# Patient Record
Sex: MALE | Race: WHITE | Employment: FULL TIME | URBAN - METROPOLITAN AREA
[De-identification: names, ages, dates, MRNs, and addresses within clinical notes are randomized per-mention and may not be internally consistent; named-entity substitution may affect disease eponyms.]

---

## 2017-07-03 ENCOUNTER — APPOINTMENT (OUTPATIENT)
Dept: ULTRASOUND IMAGING | Facility: HOSPITAL | Age: 37
DRG: 440 | End: 2017-07-03
Attending: EMERGENCY MEDICINE
Payer: COMMERCIAL

## 2017-07-03 ENCOUNTER — HOSPITAL ENCOUNTER (INPATIENT)
Facility: HOSPITAL | Age: 37
LOS: 2 days | Discharge: HOME | DRG: 440 | End: 2017-07-05
Attending: EMERGENCY MEDICINE | Admitting: HOSPITALIST
Payer: COMMERCIAL

## 2017-07-03 DIAGNOSIS — K85.90 ACUTE PANCREATITIS, UNSPECIFIED COMPLICATION STATUS, UNSPECIFIED PANCREATITIS TYPE: Primary | ICD-10-CM

## 2017-07-03 LAB
ALBUMIN SERPL-MCNC: 4 G/DL (ref 3.5–4.8)
ALP LIVER SERPL-CCNC: 62 U/L (ref 45–117)
ALT SERPL-CCNC: 28 U/L (ref 17–63)
AST SERPL-CCNC: 26 U/L (ref 15–41)
BASOPHILS # BLD AUTO: 0.03 X10(3) UL (ref 0–0.1)
BASOPHILS NFR BLD AUTO: 0.4 %
BILIRUB SERPL-MCNC: 0.5 MG/DL (ref 0.1–2)
BILIRUB UR QL STRIP.AUTO: NEGATIVE
BUN BLD-MCNC: 22 MG/DL (ref 8–20)
CALCIUM BLD-MCNC: 9.1 MG/DL (ref 8.3–10.3)
CHLORIDE: 107 MMOL/L (ref 101–111)
CLARITY UR REFRACT.AUTO: CLEAR
CO2: 25 MMOL/L (ref 22–32)
COLOR UR AUTO: YELLOW
CREAT BLD-MCNC: 1.03 MG/DL (ref 0.7–1.3)
EOSINOPHIL # BLD AUTO: 0.16 X10(3) UL (ref 0–0.3)
EOSINOPHIL NFR BLD AUTO: 2 %
ERYTHROCYTE [DISTWIDTH] IN BLOOD BY AUTOMATED COUNT: 12.3 % (ref 11.5–16)
GLUCOSE BLD-MCNC: 83 MG/DL (ref 70–99)
GLUCOSE UR STRIP.AUTO-MCNC: NEGATIVE MG/DL
HCT VFR BLD AUTO: 41.5 % (ref 37–53)
HGB BLD-MCNC: 14.2 G/DL (ref 13–17)
IMMATURE GRANULOCYTE COUNT: 0.02 X10(3) UL (ref 0–1)
IMMATURE GRANULOCYTE RATIO %: 0.2 %
KETONES UR STRIP.AUTO-MCNC: 20 MG/DL
LEUKOCYTE ESTERASE UR QL STRIP.AUTO: NEGATIVE
LIPASE: >7500 U/L (ref 73–393)
LYMPHOCYTES # BLD AUTO: 2.22 X10(3) UL (ref 0.9–4)
LYMPHOCYTES NFR BLD AUTO: 27.5 %
M PROTEIN MFR SERPL ELPH: 7.7 G/DL (ref 6.1–8.3)
MCH RBC QN AUTO: 30 PG (ref 27–33.2)
MCHC RBC AUTO-ENTMCNC: 34.2 G/DL (ref 31–37)
MCV RBC AUTO: 87.7 FL (ref 80–99)
MONOCYTES # BLD AUTO: 1.08 X10(3) UL (ref 0.1–0.6)
MONOCYTES NFR BLD AUTO: 13.4 %
NEUTROPHIL ABS PRELIM: 4.55 X10 (3) UL (ref 1.3–6.7)
NEUTROPHILS # BLD AUTO: 4.55 X10(3) UL (ref 1.3–6.7)
NEUTROPHILS NFR BLD AUTO: 56.5 %
NITRITE UR QL STRIP.AUTO: NEGATIVE
PH UR STRIP.AUTO: 5 [PH] (ref 4.5–8)
PLATELET # BLD AUTO: 212 10(3)UL (ref 150–450)
POTASSIUM SERPL-SCNC: 3.7 MMOL/L (ref 3.6–5.1)
PROT UR STRIP.AUTO-MCNC: NEGATIVE MG/DL
RBC # BLD AUTO: 4.73 X10(6)UL (ref 4.3–5.7)
RBC UR QL AUTO: NEGATIVE
RED CELL DISTRIBUTION WIDTH-SD: 39.6 FL (ref 35.1–46.3)
SODIUM SERPL-SCNC: 141 MMOL/L (ref 136–144)
SP GR UR STRIP.AUTO: 1.02 (ref 1–1.03)
UROBILINOGEN UR STRIP.AUTO-MCNC: <2 MG/DL
WBC # BLD AUTO: 8.1 X10(3) UL (ref 4–13)

## 2017-07-03 PROCEDURE — 76700 US EXAM ABDOM COMPLETE: CPT | Performed by: EMERGENCY MEDICINE

## 2017-07-03 PROCEDURE — 99222 1ST HOSP IP/OBS MODERATE 55: CPT | Performed by: HOSPITALIST

## 2017-07-03 RX ORDER — HYDROMORPHONE HYDROCHLORIDE 1 MG/ML
1 INJECTION, SOLUTION INTRAMUSCULAR; INTRAVENOUS; SUBCUTANEOUS ONCE
Status: COMPLETED | OUTPATIENT
Start: 2017-07-03 | End: 2017-07-03

## 2017-07-03 RX ORDER — HYDROMORPHONE HYDROCHLORIDE 1 MG/ML
0.2 INJECTION, SOLUTION INTRAMUSCULAR; INTRAVENOUS; SUBCUTANEOUS EVERY 2 HOUR PRN
Status: DISCONTINUED | OUTPATIENT
Start: 2017-07-03 | End: 2017-07-05

## 2017-07-03 RX ORDER — HEPARIN SODIUM 5000 [USP'U]/ML
5000 INJECTION, SOLUTION INTRAVENOUS; SUBCUTANEOUS EVERY 8 HOURS SCHEDULED
Status: DISCONTINUED | OUTPATIENT
Start: 2017-07-03 | End: 2017-07-05

## 2017-07-03 RX ORDER — HYDROMORPHONE HYDROCHLORIDE 1 MG/ML
0.4 INJECTION, SOLUTION INTRAMUSCULAR; INTRAVENOUS; SUBCUTANEOUS EVERY 2 HOUR PRN
Status: DISCONTINUED | OUTPATIENT
Start: 2017-07-03 | End: 2017-07-05

## 2017-07-03 RX ORDER — ONDANSETRON 2 MG/ML
4 INJECTION INTRAMUSCULAR; INTRAVENOUS ONCE
Status: COMPLETED | OUTPATIENT
Start: 2017-07-03 | End: 2017-07-03

## 2017-07-03 RX ORDER — SODIUM CHLORIDE, SODIUM LACTATE, POTASSIUM CHLORIDE, CALCIUM CHLORIDE 600; 310; 30; 20 MG/100ML; MG/100ML; MG/100ML; MG/100ML
INJECTION, SOLUTION INTRAVENOUS ONCE
Status: DISCONTINUED | OUTPATIENT
Start: 2017-07-03 | End: 2017-07-05

## 2017-07-03 RX ORDER — HYDROMORPHONE HYDROCHLORIDE 1 MG/ML
0.5 INJECTION, SOLUTION INTRAMUSCULAR; INTRAVENOUS; SUBCUTANEOUS EVERY 30 MIN PRN
Status: DISCONTINUED | OUTPATIENT
Start: 2017-07-03 | End: 2017-07-03

## 2017-07-03 RX ORDER — SODIUM CHLORIDE 9 MG/ML
1000 INJECTION, SOLUTION INTRAVENOUS ONCE
Status: COMPLETED | OUTPATIENT
Start: 2017-07-03 | End: 2017-07-03

## 2017-07-03 RX ORDER — SODIUM CHLORIDE, SODIUM LACTATE, POTASSIUM CHLORIDE, CALCIUM CHLORIDE 600; 310; 30; 20 MG/100ML; MG/100ML; MG/100ML; MG/100ML
INJECTION, SOLUTION INTRAVENOUS CONTINUOUS
Status: DISCONTINUED | OUTPATIENT
Start: 2017-07-03 | End: 2017-07-05

## 2017-07-03 RX ORDER — HYDROMORPHONE HYDROCHLORIDE 1 MG/ML
0.8 INJECTION, SOLUTION INTRAMUSCULAR; INTRAVENOUS; SUBCUTANEOUS EVERY 2 HOUR PRN
Status: DISCONTINUED | OUTPATIENT
Start: 2017-07-03 | End: 2017-07-05

## 2017-07-03 RX ORDER — DEXTROSE AND SODIUM CHLORIDE 5; .45 G/100ML; G/100ML
INJECTION, SOLUTION INTRAVENOUS CONTINUOUS
Status: DISCONTINUED | OUTPATIENT
Start: 2017-07-03 | End: 2017-07-03

## 2017-07-04 LAB
ALBUMIN SERPL-MCNC: 3.1 G/DL (ref 3.5–4.8)
ALP LIVER SERPL-CCNC: 50 U/L (ref 45–117)
ALT SERPL-CCNC: 22 U/L (ref 17–63)
AST SERPL-CCNC: 18 U/L (ref 15–41)
BASOPHILS # BLD AUTO: 0.04 X10(3) UL (ref 0–0.1)
BASOPHILS NFR BLD AUTO: 0.6 %
BILIRUB SERPL-MCNC: 0.8 MG/DL (ref 0.1–2)
BUN BLD-MCNC: 17 MG/DL (ref 8–20)
CALCIUM BLD-MCNC: 7.8 MG/DL (ref 8.3–10.3)
CHLORIDE: 109 MMOL/L (ref 101–111)
CO2: 25 MMOL/L (ref 22–32)
CREAT BLD-MCNC: 0.82 MG/DL (ref 0.7–1.3)
EOSINOPHIL # BLD AUTO: 0.15 X10(3) UL (ref 0–0.3)
EOSINOPHIL NFR BLD AUTO: 2.3 %
ERYTHROCYTE [DISTWIDTH] IN BLOOD BY AUTOMATED COUNT: 12.3 % (ref 11.5–16)
GLUCOSE BLD-MCNC: 84 MG/DL (ref 70–99)
HCT VFR BLD AUTO: 39.1 % (ref 37–53)
HGB BLD-MCNC: 13.1 G/DL (ref 13–17)
IMMATURE GRANULOCYTE COUNT: 0.02 X10(3) UL (ref 0–1)
IMMATURE GRANULOCYTE RATIO %: 0.3 %
LYMPHOCYTES # BLD AUTO: 1.49 X10(3) UL (ref 0.9–4)
LYMPHOCYTES NFR BLD AUTO: 23.2 %
M PROTEIN MFR SERPL ELPH: 6 G/DL (ref 6.1–8.3)
MCH RBC QN AUTO: 29.8 PG (ref 27–33.2)
MCHC RBC AUTO-ENTMCNC: 33.5 G/DL (ref 31–37)
MCV RBC AUTO: 88.9 FL (ref 80–99)
MONOCYTES # BLD AUTO: 0.65 X10(3) UL (ref 0.1–0.6)
MONOCYTES NFR BLD AUTO: 10.1 %
NEUTROPHIL ABS PRELIM: 4.08 X10 (3) UL (ref 1.3–6.7)
NEUTROPHILS # BLD AUTO: 4.08 X10(3) UL (ref 1.3–6.7)
NEUTROPHILS NFR BLD AUTO: 63.5 %
PLATELET # BLD AUTO: 186 10(3)UL (ref 150–450)
POTASSIUM SERPL-SCNC: 3.5 MMOL/L (ref 3.6–5.1)
POTASSIUM SERPL-SCNC: 3.5 MMOL/L (ref 3.6–5.1)
RBC # BLD AUTO: 4.4 X10(6)UL (ref 4.3–5.7)
RED CELL DISTRIBUTION WIDTH-SD: 40.3 FL (ref 35.1–46.3)
SODIUM SERPL-SCNC: 141 MMOL/L (ref 136–144)
WBC # BLD AUTO: 6.4 X10(3) UL (ref 4–13)

## 2017-07-04 PROCEDURE — 99232 SBSQ HOSP IP/OBS MODERATE 35: CPT | Performed by: INTERNAL MEDICINE

## 2017-07-04 RX ORDER — ONDANSETRON 2 MG/ML
8 INJECTION INTRAMUSCULAR; INTRAVENOUS EVERY 6 HOURS PRN
Status: DISCONTINUED | OUTPATIENT
Start: 2017-07-04 | End: 2017-07-05

## 2017-07-04 RX ORDER — POTASSIUM CHLORIDE 14.9 MG/ML
20 INJECTION INTRAVENOUS ONCE
Status: COMPLETED | OUTPATIENT
Start: 2017-07-04 | End: 2017-07-04

## 2017-07-04 RX ORDER — HYDROCODONE BITARTRATE AND ACETAMINOPHEN 5; 325 MG/1; MG/1
1-2 TABLET ORAL EVERY 6 HOURS PRN
Qty: 20 TABLET | Refills: 0 | Status: SHIPPED | OUTPATIENT
Start: 2017-07-04 | End: 2020-12-07 | Stop reason: ALTCHOICE

## 2017-07-04 NOTE — ED NOTES
[facility]. Pt verbalized understanding of plan of care.

## 2017-07-04 NOTE — PLAN OF CARE
Patient refusing to wear SCD's, writing RN explained the benefits of wearing SCD's to the patient and the risks associated with not wearing them, but patient continues to refuse to wear them. Patient medicated for abdominal pain, will monitor for response.

## 2017-07-04 NOTE — CONSULTS
[facility]    Gastroenterology Initial Consultation    Earle Marr Patient Status:  Inpatient    1980 MRN [id number]   [facility] [place] [facility] 3NW-A Attending Jovani De Luna MD   Hosp Day # 1 PCP None Pcp       Reason for Consultation negative  General: Denies fatigue, chills/fever, night sweats, weight loss, loss of appetite, weight gain, sleep disturbance. Neurological: Denies frequent headaches, recent passing out, recent dizziness, convulsions/seizures.   Cardiovascular: Denies hist Date   WBC 6.4 07/04/2017   HGB 13.1 07/04/2017   HCT 39.1 07/04/2017   .0 07/04/2017   CREATSERUM 0.82 07/04/2017   BUN 17 07/04/2017    07/04/2017   K 3.5 07/04/2017   K 3.5 07/04/2017    07/04/2017   CO2 25.0 07/04/2017   GLU 84 07/04

## 2017-07-04 NOTE — H&P
[facility]  History and Physical     Ruchi Chapman Patient Status:  Inpatient    1980 MRN [id number]   [facility] 3NW-A Attending Iris Pichardo MD   Hosp Day # 0 PCP None Pcp     Chief Complaint: abd pain since 4 pm today point review of systems was completed. Pertinent positives and negatives noted in the HPI. Physical Exam:    /66 (BP Location: Left arm)   Pulse 68   Temp (!) 97.4 °F (36.3 °C) (Oral)   Resp 18   SpO2 100%   General: No acute distress.  Alert an clear liquid diet as tolerated. He will be referred to GI for further evaluation including endoscopic ultrasound. 2. Dehydration continue IV fluids  3.  History of asthma intermittent stable at present time      Quality:  · DVT Prophylaxis: scd and hepari

## 2017-07-04 NOTE — PROGRESS NOTES
[facility]  Progress Note     Danny Clines Patient Status:  Inpatient    1980 MRN [id number]   [facility] 3NW-A Attending Kala Almonte MD   [facility] Day # 1 PCP None Pcp     Chief Complaint: abd pain    S: Patient without IVF  2. GI to eval  3. NPO, start diet per surgery  2. Dehdyration  1. IVF  3.  Asthma    Plan of care: continue current managemetn    Quality:  · DVT Prophylaxis: SCD, HSQ  · CODE status: Full  · Mccollum: none  · Central line: none    Estimated date of disch

## 2017-07-04 NOTE — ED PROVIDER NOTES
Patient Seen in: [facility] Emergency Department    History   Patient presents with:  Abdomen/Flank Pain (GI/)    Stated Complaint: abd pain    HPI    28-year-old white male who presents emerged from today for coming of abdominal pain.   Patient sh Physical Exam    Well-developed well-nourished male who is sitting on the gurney, he is awake and alert and appears in no acute discomfort or distress.     Vital signs are stable he is afebrile    Head is normocephalic atraumatic conjunctiva is kiera  Normal appearing gallbladder, intrahepatic ducts, and common bile duct.  Common bile duct diameter is 4 mm. Negative sonographic Soler's sign. PANCREAS:  Obscured by overlying bowel gas limiting evaluation.   SPLEEN:  Unremarkable measuring up to 10.1 cm

## 2017-07-05 VITALS
RESPIRATION RATE: 16 BRPM | TEMPERATURE: 99 F | OXYGEN SATURATION: 98 % | SYSTOLIC BLOOD PRESSURE: 108 MMHG | HEART RATE: 72 BPM | DIASTOLIC BLOOD PRESSURE: 47 MMHG

## 2017-07-05 LAB
POTASSIUM SERPL-SCNC: 4.1 MMOL/L (ref 3.6–5.1)
TRIGLYCERIDES: 101 MG/DL (ref ?–150)

## 2017-07-05 PROCEDURE — 99239 HOSP IP/OBS DSCHRG MGMT >30: CPT | Performed by: INTERNAL MEDICINE

## 2017-07-05 NOTE — PROGRESS NOTES
D/c meds and instructions explained to pt. Pt. Verbalized understanding.  IV d/c'd with catheter intact.

## 2017-07-05 NOTE — DISCHARGE SUMMARY
[facility] HOSPITALIST  DISCHARGE SUMMARY     Earle Marr Patient Status:  Inpatient    1980 MRN [id number]   [facility] 3NW-A Attending Jovani De Luna MD   Hosp Day # 2 PCP None Pcp     Date of Admission: 7/3/2017  Date of Discha this in the past at [facility] and had workup with genetic testing and ESCP w/ sphincterotomy a few years ago. He responded well to conservative management and was toleratign diet prior to DC.      Procedures during hospitalization:   • none    Incidental or signi

## 2017-07-05 NOTE — PROGRESS NOTES
[facility]    Gastroenterology Follow-Up Note      Mathew Levi Patient Status:  Inpatient    1980 MRN [id number]   [facility] 3NW-A Attending Kristeen Hashimoto, MD   Hosp Day # 2 PCP None Pcp     Chief Complaint/Reason for

## 2017-07-05 NOTE — PAYOR COMM NOTE
--------------  ADMISSION REVIEW     Payor: Iraida Sarmiento  #:  [id number]  Authorization Number: Cornelia Rudolph date: 7/3/2017  5:34 PM       Admitting Physician: Lizzeth Saul MD  Attending Physician:  Teri Floyd MD  Primary Car ---------                               -----------         ------                     CBC W/ DIFFERENTIAL[[id number]]          Abnormal            Final result                 Please view results for these tests on the individual orders.    URINALYSIS WIT HCl PF (DILAUDID) 1 MG/ML injection 0.8 mg     Date Action Dose Route User    7/4/2017 2157 Given 0.8 mg Intravenous John Ross RN      lactated ringers infusion     Date Action Dose Route User    7/4/2017 2203 New Bag (none) Intravenous Dressler, M

## 2017-07-06 LAB
IMMUNOGLOBULIN G SUBCLASS 1: 380 MG/DL
IMMUNOGLOBULIN G SUBCLASS 2: 241 MG/DL
IMMUNOGLOBULIN G SUBCLASS 3: 39 MG/DL
IMMUNOGLOBULIN G SUBCLASS 4: 33 MG/DL

## 2017-07-07 NOTE — PAYOR COMM NOTE
--------------  CONTINUED STAY REVIEW    Payor: Iraida Sarmiento  #:  [id number]  Authorization Number: Jhonnie Rich date: 7/3/2017  5:34 PM       Admitting Physician: Nathalia Triana MD  Primary Care Physician: Pcp, None    REVIEW LUIS

## 2018-01-03 ENCOUNTER — APPOINTMENT (OUTPATIENT)
Dept: LAB | Facility: HOSPITAL | Age: 38
End: 2018-01-03
Attending: INTERNAL MEDICINE
Payer: COMMERCIAL

## 2018-01-03 DIAGNOSIS — R10.13 EPIGASTRIC PAIN: ICD-10-CM

## 2018-01-03 LAB — LIPASE: 1478 U/L (ref 73–393)

## 2018-01-03 PROCEDURE — 36415 COLL VENOUS BLD VENIPUNCTURE: CPT

## 2018-01-03 PROCEDURE — 83690 ASSAY OF LIPASE: CPT

## 2019-02-15 ENCOUNTER — OFFICE VISIT (OUTPATIENT)
Dept: FAMILY MEDICINE CLINIC | Facility: CLINIC | Age: 39
End: 2019-02-15
Payer: COMMERCIAL

## 2019-02-15 VITALS
BODY MASS INDEX: 22 KG/M2 | OXYGEN SATURATION: 98 % | SYSTOLIC BLOOD PRESSURE: 122 MMHG | DIASTOLIC BLOOD PRESSURE: 76 MMHG | HEART RATE: 72 BPM | TEMPERATURE: 97 F | WEIGHT: 150 LBS

## 2019-02-15 DIAGNOSIS — H66.91 ACUTE OTITIS MEDIA, RIGHT: Primary | ICD-10-CM

## 2019-02-15 PROCEDURE — 99213 OFFICE O/P EST LOW 20 MIN: CPT | Performed by: NURSE PRACTITIONER

## 2019-02-15 RX ORDER — AMOXICILLIN 500 MG/1
500 CAPSULE ORAL 2 TIMES DAILY
Qty: 20 CAPSULE | Refills: 0 | Status: SHIPPED | OUTPATIENT
Start: 2019-02-15 | End: 2019-02-25

## 2019-02-15 NOTE — PROGRESS NOTES
CHIEF COMPLAINT:   Patient presents with:  Ear Pain: L ear x3-4 days      HPI:   Harman Thakkar is a 41year old male who presents to clinic today with complaints of L ear pain. Has had for 3-4 days. Pain is described as radiating into his jaw.   Nhi NOSE: nostrils patent, no exudates, nasal mucosa pink   THROAT: oral mucosa pink, moist. Posterior pharynx is not erythematous or injected. No exudates. NECK: supple, non-tender  LUNGS: clear to auscultation bilaterally, no wheezes or rhonchi.  Breathing i · Finish all of the antibiotic medicine given, even though you may feel better after the first few days. · You may use over-the-counter medicine, such as acetaminophen or ibuprofen, to control pain and fever, unless something else was prescribed.  If you h

## 2019-02-15 NOTE — PATIENT INSTRUCTIONS
-sudafed for fluid behind ear drum  -Take antibiotics with food and plenty of water. Eat yogurt or take probiotic daily.   Elise Cordoba is a good otc probiotic.  -follow up if no improvement in 3-4 days or worsening in symptoms  -ibuprofen as needed      [facility], [address]. All rights reserved. This information is not intended as a substitute for professional medical care. Always follow your healthcare professional's instructions.

## 2020-10-25 ENCOUNTER — IMMUNIZATION (OUTPATIENT)
Dept: FAMILY MEDICINE CLINIC | Facility: CLINIC | Age: 40
End: 2020-10-25
Payer: COMMERCIAL

## 2020-10-25 PROCEDURE — 90686 IIV4 VACC NO PRSV 0.5 ML IM: CPT | Performed by: PHYSICIAN ASSISTANT

## 2020-10-25 PROCEDURE — 90471 IMMUNIZATION ADMIN: CPT | Performed by: PHYSICIAN ASSISTANT

## 2024-06-27 ENCOUNTER — HOSPITAL ENCOUNTER (INPATIENT)
Facility: HOSPITAL | Age: 44
LOS: 2 days | Discharge: HOME | End: 2024-06-29
Attending: EMERGENCY MEDICINE | Admitting: HOSPITALIST
Payer: COMMERCIAL

## 2024-06-27 DIAGNOSIS — K85.00 IDIOPATHIC ACUTE PANCREATITIS WITHOUT INFECTION OR NECROSIS (HCC): Primary | ICD-10-CM

## 2024-06-27 LAB
ALBUMIN SERPL-MCNC: 4 G/DL (ref 3.4–5)
ALBUMIN/GLOB SERPL: 1 {RATIO} (ref 1–2)
ALP LIVER SERPL-CCNC: 79 U/L
ALT SERPL-CCNC: 24 U/L
ANION GAP SERPL CALC-SCNC: 7 MMOL/L (ref 0–18)
AST SERPL-CCNC: 17 U/L (ref 15–37)
BASOPHILS # BLD AUTO: 0.03 X10(3) UL (ref 0–0.2)
BASOPHILS NFR BLD AUTO: 0.5 %
BILIRUB SERPL-MCNC: 1 MG/DL (ref 0.1–2)
BILIRUB UR QL STRIP.AUTO: NEGATIVE
BUN BLD-MCNC: 13 MG/DL (ref 9–23)
CALCIUM BLD-MCNC: 9.4 MG/DL (ref 8.5–10.1)
CHLORIDE SERPL-SCNC: 104 MMOL/L (ref 98–112)
CLARITY UR REFRACT.AUTO: CLEAR
CO2 SERPL-SCNC: 25 MMOL/L (ref 21–32)
COLOR UR AUTO: YELLOW
CREAT BLD-MCNC: 0.92 MG/DL
EGFRCR SERPLBLD CKD-EPI 2021: 107 ML/MIN/1.73M2 (ref 60–?)
EOSINOPHIL # BLD AUTO: 0.18 X10(3) UL (ref 0–0.7)
EOSINOPHIL NFR BLD AUTO: 2.8 %
ERYTHROCYTE [DISTWIDTH] IN BLOOD BY AUTOMATED COUNT: 11.9 %
GLOBULIN PLAS-MCNC: 3.9 G/DL (ref 2.8–4.4)
GLUCOSE BLD-MCNC: 90 MG/DL (ref 70–99)
GLUCOSE UR STRIP.AUTO-MCNC: NORMAL MG/DL
HCT VFR BLD AUTO: 44.1 %
HGB BLD-MCNC: 15.5 G/DL
IMM GRANULOCYTES # BLD AUTO: 0.02 X10(3) UL (ref 0–1)
IMM GRANULOCYTES NFR BLD: 0.3 %
KETONES UR STRIP.AUTO-MCNC: 40 MG/DL
LEUKOCYTE ESTERASE UR QL STRIP.AUTO: NEGATIVE
LIPASE SERPL-CCNC: 4759 U/L (ref 13–75)
LYMPHOCYTES # BLD AUTO: 1.29 X10(3) UL (ref 1–4)
LYMPHOCYTES NFR BLD AUTO: 20.1 %
MCH RBC QN AUTO: 30.8 PG (ref 26–34)
MCHC RBC AUTO-ENTMCNC: 35.1 G/DL (ref 31–37)
MCV RBC AUTO: 87.7 FL
MONOCYTES # BLD AUTO: 0.57 X10(3) UL (ref 0.1–1)
MONOCYTES NFR BLD AUTO: 8.9 %
NEUTROPHILS # BLD AUTO: 4.34 X10 (3) UL (ref 1.5–7.7)
NEUTROPHILS # BLD AUTO: 4.34 X10(3) UL (ref 1.5–7.7)
NEUTROPHILS NFR BLD AUTO: 67.4 %
NITRITE UR QL STRIP.AUTO: NEGATIVE
OSMOLALITY SERPL CALC.SUM OF ELEC: 282 MOSM/KG (ref 275–295)
PH UR STRIP.AUTO: 7.5 [PH] (ref 5–8)
PLATELET # BLD AUTO: 249 10(3)UL (ref 150–450)
POTASSIUM SERPL-SCNC: 4.2 MMOL/L (ref 3.5–5.1)
PROT SERPL-MCNC: 7.9 G/DL (ref 6.4–8.2)
PROT UR STRIP.AUTO-MCNC: 20 MG/DL
RBC # BLD AUTO: 5.03 X10(6)UL
RBC UR QL AUTO: NEGATIVE
SODIUM SERPL-SCNC: 136 MMOL/L (ref 136–145)
SP GR UR STRIP.AUTO: 1.03 (ref 1–1.03)
UROBILINOGEN UR STRIP.AUTO-MCNC: 2 MG/DL
WBC # BLD AUTO: 6.4 X10(3) UL (ref 4–11)

## 2024-06-27 PROCEDURE — 80053 COMPREHEN METABOLIC PANEL: CPT

## 2024-06-27 PROCEDURE — 85025 COMPLETE CBC W/AUTO DIFF WBC: CPT

## 2024-06-27 PROCEDURE — 83690 ASSAY OF LIPASE: CPT

## 2024-06-27 PROCEDURE — 80053 COMPREHEN METABOLIC PANEL: CPT | Performed by: EMERGENCY MEDICINE

## 2024-06-27 PROCEDURE — 96374 THER/PROPH/DIAG INJ IV PUSH: CPT

## 2024-06-27 PROCEDURE — 83690 ASSAY OF LIPASE: CPT | Performed by: EMERGENCY MEDICINE

## 2024-06-27 PROCEDURE — 96375 TX/PRO/DX INJ NEW DRUG ADDON: CPT

## 2024-06-27 PROCEDURE — 81001 URINALYSIS AUTO W/SCOPE: CPT | Performed by: EMERGENCY MEDICINE

## 2024-06-27 PROCEDURE — 85025 COMPLETE CBC W/AUTO DIFF WBC: CPT | Performed by: EMERGENCY MEDICINE

## 2024-06-27 PROCEDURE — 99285 EMERGENCY DEPT VISIT HI MDM: CPT

## 2024-06-27 PROCEDURE — 81001 URINALYSIS AUTO W/SCOPE: CPT

## 2024-06-27 PROCEDURE — 96361 HYDRATE IV INFUSION ADD-ON: CPT

## 2024-06-27 RX ORDER — ALBUTEROL SULFATE 90 UG/1
2 AEROSOL, METERED RESPIRATORY (INHALATION) EVERY 6 HOURS PRN
Status: DISCONTINUED | OUTPATIENT
Start: 2024-06-27 | End: 2024-06-29

## 2024-06-27 RX ORDER — HYDROMORPHONE HYDROCHLORIDE 1 MG/ML
0.5 INJECTION, SOLUTION INTRAMUSCULAR; INTRAVENOUS; SUBCUTANEOUS EVERY 30 MIN PRN
Status: DISCONTINUED | OUTPATIENT
Start: 2024-06-27 | End: 2024-06-27 | Stop reason: HOSPADM

## 2024-06-27 RX ORDER — BISACODYL 10 MG
10 SUPPOSITORY, RECTAL RECTAL
Status: DISCONTINUED | OUTPATIENT
Start: 2024-06-27 | End: 2024-06-29

## 2024-06-27 RX ORDER — SENNOSIDES 8.6 MG
17.2 TABLET ORAL NIGHTLY PRN
Status: DISCONTINUED | OUTPATIENT
Start: 2024-06-27 | End: 2024-06-29

## 2024-06-27 RX ORDER — POLYETHYLENE GLYCOL 3350 17 G/17G
17 POWDER, FOR SOLUTION ORAL DAILY PRN
Status: DISCONTINUED | OUTPATIENT
Start: 2024-06-27 | End: 2024-06-29

## 2024-06-27 RX ORDER — ONDANSETRON 2 MG/ML
4 INJECTION INTRAMUSCULAR; INTRAVENOUS EVERY 6 HOURS PRN
Status: DISCONTINUED | OUTPATIENT
Start: 2024-06-27 | End: 2024-06-29

## 2024-06-27 RX ORDER — HYDROMORPHONE HYDROCHLORIDE 1 MG/ML
0.2 INJECTION, SOLUTION INTRAMUSCULAR; INTRAVENOUS; SUBCUTANEOUS EVERY 2 HOUR PRN
Status: DISCONTINUED | OUTPATIENT
Start: 2024-06-27 | End: 2024-06-29

## 2024-06-27 RX ORDER — SODIUM CHLORIDE, SODIUM LACTATE, POTASSIUM CHLORIDE, CALCIUM CHLORIDE 600; 310; 30; 20 MG/100ML; MG/100ML; MG/100ML; MG/100ML
INJECTION, SOLUTION INTRAVENOUS CONTINUOUS
Status: DISCONTINUED | OUTPATIENT
Start: 2024-06-27 | End: 2024-06-29

## 2024-06-27 RX ORDER — HYDROMORPHONE HYDROCHLORIDE 1 MG/ML
0.5 INJECTION, SOLUTION INTRAMUSCULAR; INTRAVENOUS; SUBCUTANEOUS EVERY 30 MIN PRN
Status: DISCONTINUED | OUTPATIENT
Start: 2024-06-27 | End: 2024-06-27

## 2024-06-27 RX ORDER — MELATONIN
3 NIGHTLY PRN
Status: DISCONTINUED | OUTPATIENT
Start: 2024-06-27 | End: 2024-06-29

## 2024-06-27 RX ORDER — ENOXAPARIN SODIUM 100 MG/ML
40 INJECTION SUBCUTANEOUS DAILY
Status: DISCONTINUED | OUTPATIENT
Start: 2024-06-27 | End: 2024-06-29

## 2024-06-27 RX ORDER — MORPHINE SULFATE 4 MG/ML
4 INJECTION, SOLUTION INTRAMUSCULAR; INTRAVENOUS EVERY 2 HOUR PRN
Status: DISCONTINUED | OUTPATIENT
Start: 2024-06-27 | End: 2024-06-27

## 2024-06-27 RX ORDER — ENEMA 19; 7 G/133ML; G/133ML
1 ENEMA RECTAL ONCE AS NEEDED
Status: DISCONTINUED | OUTPATIENT
Start: 2024-06-27 | End: 2024-06-29

## 2024-06-27 RX ORDER — PROCHLORPERAZINE EDISYLATE 5 MG/ML
5 INJECTION INTRAMUSCULAR; INTRAVENOUS EVERY 8 HOURS PRN
Status: DISCONTINUED | OUTPATIENT
Start: 2024-06-27 | End: 2024-06-29

## 2024-06-27 RX ORDER — ONDANSETRON 2 MG/ML
4 INJECTION INTRAMUSCULAR; INTRAVENOUS EVERY 4 HOURS PRN
Status: DISCONTINUED | OUTPATIENT
Start: 2024-06-27 | End: 2024-06-27

## 2024-06-27 RX ORDER — SODIUM CHLORIDE 9 MG/ML
INJECTION, SOLUTION INTRAVENOUS CONTINUOUS
Status: ACTIVE | OUTPATIENT
Start: 2024-06-27 | End: 2024-06-27

## 2024-06-27 RX ORDER — ACETAMINOPHEN 325 MG/1
650 TABLET ORAL EVERY 4 HOURS PRN
Status: DISCONTINUED | OUTPATIENT
Start: 2024-06-27 | End: 2024-06-29

## 2024-06-27 RX ORDER — KETOROLAC TROMETHAMINE 15 MG/ML
15 INJECTION, SOLUTION INTRAMUSCULAR; INTRAVENOUS ONCE
Status: COMPLETED | OUTPATIENT
Start: 2024-06-27 | End: 2024-06-27

## 2024-06-27 RX ORDER — HYDROMORPHONE HYDROCHLORIDE 1 MG/ML
0.4 INJECTION, SOLUTION INTRAMUSCULAR; INTRAVENOUS; SUBCUTANEOUS EVERY 2 HOUR PRN
Status: DISCONTINUED | OUTPATIENT
Start: 2024-06-27 | End: 2024-06-29

## 2024-06-27 RX ORDER — MORPHINE SULFATE 4 MG/ML
2 INJECTION, SOLUTION INTRAMUSCULAR; INTRAVENOUS EVERY 2 HOUR PRN
Status: DISCONTINUED | OUTPATIENT
Start: 2024-06-27 | End: 2024-06-27

## 2024-06-27 RX ORDER — HYDROCODONE BITARTRATE AND ACETAMINOPHEN 5; 325 MG/1; MG/1
2 TABLET ORAL EVERY 4 HOURS PRN
Status: DISCONTINUED | OUTPATIENT
Start: 2024-06-27 | End: 2024-06-29

## 2024-06-27 RX ORDER — MORPHINE SULFATE 4 MG/ML
1 INJECTION, SOLUTION INTRAMUSCULAR; INTRAVENOUS EVERY 2 HOUR PRN
Status: DISCONTINUED | OUTPATIENT
Start: 2024-06-27 | End: 2024-06-27

## 2024-06-27 RX ORDER — HYDROCODONE BITARTRATE AND ACETAMINOPHEN 5; 325 MG/1; MG/1
1 TABLET ORAL EVERY 4 HOURS PRN
Status: DISCONTINUED | OUTPATIENT
Start: 2024-06-27 | End: 2024-06-29

## 2024-06-27 RX ORDER — HYDROMORPHONE HYDROCHLORIDE 1 MG/ML
0.8 INJECTION, SOLUTION INTRAMUSCULAR; INTRAVENOUS; SUBCUTANEOUS EVERY 2 HOUR PRN
Status: DISCONTINUED | OUTPATIENT
Start: 2024-06-27 | End: 2024-06-29

## 2024-06-27 RX ORDER — KETOROLAC TROMETHAMINE 15 MG/ML
15 INJECTION, SOLUTION INTRAMUSCULAR; INTRAVENOUS EVERY 6 HOURS PRN
Status: DISPENSED | OUTPATIENT
Start: 2024-06-27 | End: 2024-06-29

## 2024-06-27 NOTE — ED QUICK NOTES
Orders for admission, patient is aware of plan and ready to go upstairs. Any questions, please call ED RN Pio at extension [phone].     Patient Covid vaccination status: Fully vaccinated     COVID Test Ordered in ED: None    COVID Suspicion at Admission: N/A    Running Infusions:  Normal saline 999/hr    Mental Status/LOC at time of transport: A&Ox4    Other pertinent information:   CIWA score: N/A   NIH score:  N/A

## 2024-06-27 NOTE — PROGRESS NOTES
NURSING ADMISSION NOTE      Patient admitted via Cart  Oriented to room.  Safety precautions initiated.  Bed in low position.  Call light in reach.    Admitted with pancreatitis, pain currently controlled, denies nausea. Upper abdominal tenderness with palpation. IVF. NPO. Up self. POC reviewed, call light within reach.

## 2024-06-27 NOTE — ED INITIAL ASSESSMENT (HPI)
Patient arrives to the ED with c/o abd pain that started Friday noc.    Friday noc patient states his pancreatitis started to flare up. Normally he would fast, have simple diet, and normally recovers on his own. This time, his pain has shot back up. Has not been eating for the last 24 hours. Here to r/o pancreatitis.

## 2024-06-27 NOTE — PHYSICAL THERAPY NOTE
PT order received. Chart reviewed. Per discussion with RN, pt has no skilled IP PT needs at this time as he is up ad quique without concerns. Will sign off on current order. If change in functional mobility status occurs, please re-order therapy services.

## 2024-06-27 NOTE — H&P
[facility] History and Physical      Chief Complaint   Patient presents with    Abdomen/Flank Pain        PCP: Morgan Gallegos MD      History of Present Illness: Patient is a 43 year old male with PMH sig for asthma and history of chronic recurrent idiopathic pancreatitis last episode in 2017 presented with abdominal pain.  Symptoms started about Friday and he recognized it as similar to his prior episodes.  He decreased his oral intake and started fasting in hopes that it would spontaneously resolved but the symptoms continue to worsen and progress.  He has not eaten in the last 24 hours and given ongoing symptoms he came to the ER for further evaluation and treatment.  In the ER his vitals were stable.  Labs significant for a lipase of 4700.  Started on aggressive isotonic crystalloids, GI consulted and admitted for further evaluation and treatment.    Past Medical History:    Asthma due to environmental allergies (HCC)    dust and cats    Chronic recurrent pancreatitis (HCC)    last episode 2017. idiopathic, CFTR/PRSS1/SPINK1 - neg genetics 12/9/13. Rx Zenpep, lipase, PPI and Carafate in past. Neg IgG4 and anti tTGAb, MR  neg    Erectile dysfunction    difficulty maintaining erection - 10 mg Cialis prn. Dr. Cason    GERD (gastroesophageal reflux disease)    Mild intermittent asthma (HCC)    albuterol MDI prn - years since used      Past Surgical History:   Procedure Laterality Date    Egd  06/20/2012    [facility] , Endoscopic US- neg    Ercp,diagnostic  08/03/2012    Dr. Lofton at [facility] [place] - nml    Excision of lesion, eyelid - os - left eye Left 07/29/2021    Dr. Correa    Joint aspiration/injection Left 07/2021    Dr. Mcguire    Other surgical history  2012    sphincterectomy for pancreatitis    [place] teeth removed  2000 4        ALL:  Allergies   Allergen Reactions    Radiology Contrast Iodinated Dyes RASH        Prior to Admission Medications   Medication Sig    ALBUTEROL 108 (90 Base) MCG/ACT Inhalation Aero  Soln TAKE 2 PUFFS BY MOUTH EVERY 6 HOURS AS NEEDED FOR WHEEZE OR SHORTNESS OF BREATH    Mometasone Furoate 0.1 % External Cream Apply to affected areas twice daily as needed    hydrocortisone 2.5 % External Cream Apply to affected areas as needed  - consider 7 days at a time    Tadalafil 20 MG Oral Tab Take 1 tablet (20 mg total) by mouth daily as needed.       Social History     Tobacco Use    Smoking status: Never    Smokeless tobacco: Never   Substance Use Topics    Alcohol use: Yes     Comment: couple times a yr - 2 beers        Fam Hx  Family History   Problem Relation Age of Onset    Hypertension Father     Thyroid Disorder Mother     No Known Problems Daughter     No Known Problems Son     Dementia Maternal Grandmother     Melanoma Maternal Grandmother     Gastro-Intestinal Disorder Maternal Grandfather         celiac disease    Prostate Cancer Maternal Grandfather     No Known Problems Paternal Grandmother     Heart Disorder Paternal Grandfather     Colon Cancer Paternal Grandfather 50    No Known Problems Brother        Review of Systems  Comprehensive ROS reviewed and negative except for what is stated in HPI.      OBJECTIVE:  /72   Pulse 63   Temp 97.8 °F (36.6 °C) (Temporal)   Resp 16   Ht 5' 9\" (1.753 m)   Wt 175 lb (79.4 kg)   SpO2 100%   BMI 25.84 kg/m²   General:  Alert, no distress, appears stated age.    Head:  Normocephalic, without obvious abnormality, atraumatic.   Eyes:  Sclera anicteric, No conjunctival pallor, EOMs intact.    Nose: Nares normal. Septum midline. Mucosa normal. No drainage.   Throat: Lips, mucosa, and tongue normal. Teeth and gums normal.   Neck: Supple, symmetrical, trachea midline, no cervical or supraclavicular lymph adenopathy, thyroid: no enlargment/tenderness/nodules appreciated   Lungs:   Clear to auscultation bilaterally. Normal effort   Chest wall:  No tenderness or deformity.   Heart:  Regular rate and rhythm, S1, S2 normal, no murmur, rub or gallop  appreciated   Abdomen:   Soft, non-tender. Bowel sounds normal. No masses,  No organomegaly. Non distended   Extremities: Extremities normal, atraumatic, no cyanosis or edema.   Skin: Skin color, texture, turgor normal. No rashes or lesions.    Neurologic: Normal strength, no focal deficit appreciated     Data Review:    LABS:   Lab Results   Component Value Date    WBC 6.4 06/27/2024    HGB 15.5 06/27/2024    HCT 44.1 06/27/2024    .0 06/27/2024    CREATSERUM 0.92 06/27/2024    BUN 13 06/27/2024     06/27/2024    K 4.2 06/27/2024     06/27/2024    CO2 25.0 06/27/2024    GLU 90 06/27/2024    CA 9.4 06/27/2024    ALB 4.0 06/27/2024    ALKPHO 79 06/27/2024    BILT 1.0 06/27/2024    TP 7.9 06/27/2024    AST 17 06/27/2024    ALT 24 06/27/2024    LIP 4,759 06/27/2024       CXR: All imaging personally reviewed.      Radiology: No results found.     Assessment/Plan:     43 year old male with PMH sig for asthma and history of chronic recurrent idiopathic pancreatitis last episode in 2017 presented with abdominal pain.    Acute pancreatitis, ?etiology  - NPO  - Aggressive isotonic crystalloids  - prn analgesics  - GI consult, defer imaging    Asthma, mild   - prn albuterol    FEN: NPO, PT/OT  Proph: SCDs, lovenox  Code status: Full code    Outpatient records or previous hospital records reviewed.   [facility] hospitalist to continue to follow patient while in house      Arline Cool MD  [facility]  Hospitalist  Message over [employer]  Pager: [phone]

## 2024-06-27 NOTE — ED PROVIDER NOTES
Patient Seen in: [facility] Emergency Department      History     Chief Complaint   Patient presents with    Abdomen/Flank Pain     Stated Complaint: abd pain, hx of chronic pancreatitis    Subjective:   HPI    43-year-old male who presents to the emergency department complaining of having epigastric abdominal pain.  The patient says is very similar to his episodes of pancreatitis.  Symptoms began 6 days ago.  He attempted to fast and take pancreatic enzymes to try and help with the symptomology but had no relief.  He started becoming concerned as the pain became increasing over the past couple days.  No vomiting.  No change in stool habits.  No fevers or chills.  Reviewing his records he was last hospitalized here on 7/3/2017 for acute pancreatitis.  The patient tells me that he has had a sphincterotomy and his workup for his pancreatitis has not elicited a specific etiology to account for his episodes.  The patient denies any alcohol use.  Said the pain came on suddenly 6 days ago.    Objective:   Past Medical History:    Asthma due to environmental allergies (HCC)    dust and cats    Chronic recurrent pancreatitis (HCC)    last episode 2017. idiopathic, CFTR/PRSS1/SPINK1 - neg genetics 12/9/13. Rx Zenpep, lipase, PPI and Carafate in past. Neg IgG4 and anti tTGAb, MR  neg    Erectile dysfunction    difficulty maintaining erection - 10 mg Cialis prn. Dr. Cason    GERD (gastroesophageal reflux disease)    Mild intermittent asthma (HCC)    albuterol MDI prn - years since used              Past Surgical History:   Procedure Laterality Date    Egd  06/20/2012    [facility] , Endoscopic US- neg    Ercp,diagnostic  08/03/2012    Dr. Lofton at [facility] [place] - nml    Excision of lesion, eyelid - os - left eye Left 07/29/2021    Dr. Correa    Joint aspiration/injection Left 07/2021    Dr. Mcguire    Other surgical history  2012    sphincterectomy for pancreatitis    [place] teeth removed  2000 4                Social History      Socioeconomic History    Marital status:    Occupational History    Occupation:      Employer: [employer]     Comment: office 4 d/wk   Tobacco Use    Smoking status: Never    Smokeless tobacco: Never   Vaping Use    Vaping status: Never Used   Substance and Sexual Activity    Alcohol use: Yes     Comment: couple times a yr - 2 beers    Drug use: Never    Sexual activity: Yes     Partners: Female     Birth control/protection: OCP     Comment: 1/yr, no sti   Other Topics Concern     Service No    Blood Transfusions No    Caffeine Concern No     Comment: 0-1    Occupational Exposure No    Hobby Hazards No    Sleep Concern No    Stress Concern No    Weight Concern No    Special Diet Yes     Comment: low fat, avoid Etoh    Back Care Yes    Exercise Yes     Comment: weights and cardio 6d/wk    Bike Helmet Yes    Seat Belt Yes    Self-Exams Yes   Social History Narrative    2020 - lives w/wife, 2 sons 6 &4, and dtr 9, and dog              Review of Systems    Positive for stated Chief Complaint: Abdomen/Flank Pain    Other systems are as noted in HPI.  Constitutional and vital signs reviewed.      All other systems reviewed and negative except as noted above.    Physical Exam     ED Triage Vitals [06/27/24 1117]   /79   Pulse 71   Resp 16   Temp 97.8 °F (36.6 °C)   Temp src Temporal   SpO2 98 %   O2 Device None (Room air)       Current Vitals:   Vital Signs  BP: 123/70  Pulse: 70  Resp: 18  Temp: 97.8 °F (36.6 °C)  Temp src: Temporal    Oxygen Therapy  SpO2: 98 %  O2 Device: None (Room air)            Physical Exam  General: This a pleasant nontoxic appearing patient in no apparent distress alert and oriented ×3  HEENT: Pupils are equal reactive to light.  Extra ocular motions are intact.  No scleral icterus or conjunctival pallor: Neck is supple without tenderness on palpation.  Head is atraumatic normocephalic.  Oral mucosa moist.  Tongue is midline.  No posterior pharyngeal  lesions.  Tympanic members are intact and clear bilaterally.  No JVD or adenopathy.  Lungs: Clear to auscultation bilaterally.  No wheezes, rhonchi, or rales appreciated.  No accessory muscle use noted for breathing.  Cardiac: Regular rate and rhythm.  Normal S1 and 2 without murmurs or ectopy appreciated  Abdomen: Soft on examination without tenderness to deep palpation or to percussion.  No masses appreciated.  Bowel sounds are normoactive.  No CVA tenderness.  Extremities: No cyanosis, no edema or clubbing.  Pulses are +2.  Full range of motion is noted of the extremities without deformities.  No tenderness.  Neurologically intact.       ED Course     Labs Reviewed   LIPASE - Abnormal; Notable for the following components:       Result Value    Lipase 4,759 (*)     All other components within normal limits   URINALYSIS, ROUTINE - Abnormal; Notable for the following components:    Ketones Urine 40 (*)     Protein Urine 20 (*)     Urobilinogen Urine 2 (*)     All other components within normal limits   COMP METABOLIC PANEL (14) - Normal   CBC WITH DIFFERENTIAL WITH PLATELET    Narrative:     The following orders were created for panel order CBC With Differential With Platelet.  Procedure                               Abnormality         Status                     ---------                               -----------         ------                     CBC W/ DIFFERENTIAL[[id number]]                              Final result                 Please view results for these tests on the individual orders.   RAINBOW DRAW LAVENDER   RAINBOW DRAW LIGHT GREEN   RAINBOW DRAW GOLD   RAINBOW DRAW BLUE   CBC W/ DIFFERENTIAL                      MDM    Differential diagnosis includes but is not limited to pancreatitis secondary to obstruction, idiopathic pancreatitis, pseudocyst, alcoholic pancreatitis.  Admission disposition: 6/27/2024 12:49 PM       The patient will have laboratories performed.  He will receive IV fluids as well  as IV pain medications and kept NPO.  Patient's lipase was 4759.  CBC was essentially normal.  Metabolic panel was essentially normal.  The patient received Toradol and Dilaudid for pain control.  Urinalysis did not yield any significant findings and his urobilinogen was 2 protein was 20 ketones of 40 but there were negative nitrite negative leukocyte esterase.  Patient will be admitted to the hospital for continued management.  Hospital service from [facility] will be contacted.  He is admitted in good condition.  Initially the patient told me he had seen a different GI service in the past and had paged them but turns out the patient is actually seeing the [facility] service more recently and they will be contacted.                         Medical Decision Making      Disposition and Plan     Clinical Impression:  1. Idiopathic acute pancreatitis without infection or necrosis (HCC)         Disposition:  Admit  6/27/2024 12:49 pm    Follow-up:  No follow-up provider specified.        Medications Prescribed:  Current Discharge Medication List                            Hospital Problems       Present on Admission  Date Reviewed: 12/14/2021            ICD-10-CM Noted POA    * (Principal) Idiopathic acute pancreatitis without infection or necrosis (HCC) K85.00 6/27/2024 Unknown

## 2024-06-28 ENCOUNTER — APPOINTMENT (OUTPATIENT)
Dept: MRI IMAGING | Facility: HOSPITAL | Age: 44
End: 2024-06-28
Attending: INTERNAL MEDICINE
Payer: COMMERCIAL

## 2024-06-28 LAB
ALBUMIN SERPL-MCNC: 3.1 G/DL (ref 3.4–5)
ALBUMIN/GLOB SERPL: 0.9 {RATIO} (ref 1–2)
ALP LIVER SERPL-CCNC: 67 U/L
ALT SERPL-CCNC: 19 U/L
ANION GAP SERPL CALC-SCNC: 9 MMOL/L (ref 0–18)
AST SERPL-CCNC: 12 U/L (ref 15–37)
BASOPHILS # BLD AUTO: 0.02 X10(3) UL (ref 0–0.2)
BASOPHILS NFR BLD AUTO: 0.3 %
BILIRUB SERPL-MCNC: 1.1 MG/DL (ref 0.1–2)
BUN BLD-MCNC: 13 MG/DL (ref 9–23)
CALCIUM BLD-MCNC: 8.4 MG/DL (ref 8.5–10.1)
CHLORIDE SERPL-SCNC: 106 MMOL/L (ref 98–112)
CO2 SERPL-SCNC: 21 MMOL/L (ref 21–32)
CREAT BLD-MCNC: 0.75 MG/DL
EGFRCR SERPLBLD CKD-EPI 2021: 116 ML/MIN/1.73M2 (ref 60–?)
EOSINOPHIL # BLD AUTO: 0.12 X10(3) UL (ref 0–0.7)
EOSINOPHIL NFR BLD AUTO: 1.8 %
ERYTHROCYTE [DISTWIDTH] IN BLOOD BY AUTOMATED COUNT: 11.5 %
GLOBULIN PLAS-MCNC: 3.5 G/DL (ref 2.8–4.4)
GLUCOSE BLD-MCNC: 79 MG/DL (ref 70–99)
HCT VFR BLD AUTO: 38 %
HGB BLD-MCNC: 13.3 G/DL
IMM GRANULOCYTES # BLD AUTO: 0.02 X10(3) UL (ref 0–1)
IMM GRANULOCYTES NFR BLD: 0.3 %
LYMPHOCYTES # BLD AUTO: 1.01 X10(3) UL (ref 1–4)
LYMPHOCYTES NFR BLD AUTO: 15.4 %
MAGNESIUM SERPL-MCNC: 1.8 MG/DL (ref 1.6–2.6)
MCH RBC QN AUTO: 30.9 PG (ref 26–34)
MCHC RBC AUTO-ENTMCNC: 35 G/DL (ref 31–37)
MCV RBC AUTO: 88.2 FL
MONOCYTES # BLD AUTO: 0.48 X10(3) UL (ref 0.1–1)
MONOCYTES NFR BLD AUTO: 7.3 %
NEUTROPHILS # BLD AUTO: 4.92 X10 (3) UL (ref 1.5–7.7)
NEUTROPHILS # BLD AUTO: 4.92 X10(3) UL (ref 1.5–7.7)
NEUTROPHILS NFR BLD AUTO: 74.9 %
OSMOLALITY SERPL CALC.SUM OF ELEC: 281 MOSM/KG (ref 275–295)
PLATELET # BLD AUTO: 201 10(3)UL (ref 150–450)
POTASSIUM SERPL-SCNC: 4 MMOL/L (ref 3.5–5.1)
PROT SERPL-MCNC: 6.6 G/DL (ref 6.4–8.2)
RBC # BLD AUTO: 4.31 X10(6)UL
SODIUM SERPL-SCNC: 136 MMOL/L (ref 136–145)
WBC # BLD AUTO: 6.6 X10(3) UL (ref 4–11)

## 2024-06-28 PROCEDURE — 83735 ASSAY OF MAGNESIUM: CPT | Performed by: HOSPITALIST

## 2024-06-28 PROCEDURE — 80053 COMPREHEN METABOLIC PANEL: CPT | Performed by: HOSPITALIST

## 2024-06-28 PROCEDURE — 74183 MRI ABD W/O CNTR FLWD CNTR: CPT | Performed by: INTERNAL MEDICINE

## 2024-06-28 PROCEDURE — 76376 3D RENDER W/INTRP POSTPROCES: CPT | Performed by: INTERNAL MEDICINE

## 2024-06-28 PROCEDURE — A9575 INJ GADOTERATE MEGLUMI 0.1ML: HCPCS | Performed by: HOSPITALIST

## 2024-06-28 PROCEDURE — 85025 COMPLETE CBC W/AUTO DIFF WBC: CPT | Performed by: HOSPITALIST

## 2024-06-28 RX ORDER — GADOTERATE MEGLUMINE 376.9 MG/ML
20 INJECTION INTRAVENOUS
Status: COMPLETED | OUTPATIENT
Start: 2024-06-28 | End: 2024-06-28

## 2024-06-28 NOTE — PLAN OF CARE
Resumed care at 0730. Aox4, ambulating in room. Tolerating clear liquids, ADAT to low fat per Dr. Chua. Denies nausea. C/o moderate abdominal pain, pain meds given with relief.     Patient transferred to [facility] this afternoon. Report given to Juanis YANG. Transferred with all belongings, wheelchair.

## 2024-06-28 NOTE — PLAN OF CARE
Pt received @1700 from [facility]. VSS, afebrile. C/o mild abdominal discomfort declines need for pain medication. Advanced to low fiber/low fat diet. No reports of nausea or vomiting at this time. All questions answered. Call light within reach.

## 2024-06-28 NOTE — PAYOR COMM NOTE
--------------  ADMISSION REVIEW     Payor: [employer]  Subscriber #:  [id number]  Authorization Number: [id number]    Admit date: 6/27/24  Admit time:  2:18 PM       REVIEW DOCUMENTATION:     ED Provider Notes          Stated Complaint: abd pain, hx of chronic pancreatitis    Subjective:   HPI    43-year-old male who presents to the emergency department complaining of having epigastric abdominal pain.  The patient says is very similar to his episodes of pancreatitis.  Symptoms began 6 days ago.  He attempted to fast and take pancreatic enzymes to try and help with the symptomology but had no relief.  He started becoming concerned as the pain became increasing over the past couple days.  No vomiting.  No change in stool habits.  No fevers or chills.  Reviewing his records he was last hospitalized here on 7/3/2017 for acute pancreatitis.  The patient tells me that he has had a sphincterotomy and his workup for his pancreatitis has not elicited a specific etiology to account for his episodes.  The patient denies any alcohol use.  Said the pain came on suddenly 6 days ago.      Physical Exam     ED Triage Vitals [06/27/24 1117]   /79   Pulse 71   Resp 16   Temp 97.8 °F (36.6 °C)   Temp src Temporal   SpO2 98 %   O2 Device None (Room air)       Current Vitals:   Vital Signs  BP: 123/70  Pulse: 70  Resp: 18  Temp: 97.8 °F (36.6 °C)  Temp src: Temporal    Oxygen Therapy  SpO2: 98 %  O2 Device: None (Room air)      Physical Exam  General: This a pleasant nontoxic appearing patient in no apparent distress alert and oriented ×3  HEENT: Pupils are equal reactive to light.  Extra ocular motions are intact.  No scleral icterus or conjunctival pallor: Neck is supple without tenderness on palpation.  Head is atraumatic normocephalic.  Oral mucosa moist.  Tongue is midline.  No posterior pharyngeal lesions.  Tympanic members are intact and clear bilaterally.  No JVD or adenopathy.  Lungs: Clear to auscultation  bilaterally.  No wheezes, rhonchi, or rales appreciated.  No accessory muscle use noted for breathing.  Cardiac: Regular rate and rhythm.  Normal S1 and 2 without murmurs or ectopy appreciated  Abdomen: Soft on examination without tenderness to deep palpation or to percussion.  No masses appreciated.  Bowel sounds are normoactive.  No CVA tenderness.  Extremities: No cyanosis, no edema or clubbing.  Pulses are +2.  Full range of motion is noted of the extremities without deformities.  No tenderness.  Neurologically intact.       ED Course     Labs Reviewed   LIPASE - Abnormal; Notable for the following components:       Result Value    Lipase 4,759 (*)     All other components within normal limits   URINALYSIS, ROUTINE - Abnormal; Notable for the following components:    Ketones Urine 40 (*)     Protein Urine 20 (*)     Urobilinogen Urine 2 (*)     All other components within normal limits   COMP METABOLIC PANEL (14) - Normal       MDM    Differential diagnosis includes but is not limited to pancreatitis secondary to obstruction, idiopathic pancreatitis, pseudocyst, alcoholic pancreatitis.  Admission disposition: 6/27/2024 12:49 PM       The patient will have laboratories performed.  He will receive IV fluids as well as IV pain medications and kept NPO.  Patient's lipase was 4759.  CBC was essentially normal.  Metabolic panel was essentially normal.  The patient received Toradol and Dilaudid for pain control.  Urinalysis did not yield any significant findings and his urobilinogen was 2 protein was 20 ketones of 40 but there were negative nitrite negative leukocyte esterase.  Patient will be admitted to the hospital for continued management.  Hospital service from [facility] will be contacted.  He is admitted in good condition.  Initially the patient told me he had seen a different GI service in the past and had paged them but turns out the patient is actually seeing the [facility] service more recently and they will be  contacted.      Medical Decision Making      Disposition and Plan     Clinical Impression:  1. Idiopathic acute pancreatitis without infection or necrosis (HCC)         Disposition:  Admit  6/27/2024 12:49 pm           Hospital Problems       Present on Admission  Date Reviewed: 12/14/2021            ICD-10-CM Noted POA    * (Principal) Idiopathic acute pancreatitis without infection or necrosis (HCC) K85.00 6/27/2024 Unknown           HOSPITALIST:    Chief Complaint   Patient presents with    Abdomen/Flank Pain         PCP: Morgan Gallegos MD        History of Present Illness: Patient is a 43 year old male with PMH sig for asthma and history of chronic recurrent idiopathic pancreatitis last episode in 2017 presented with abdominal pain.  Symptoms started about Friday and he recognized it as similar to his prior episodes.  He decreased his oral intake and started fasting in hopes that it would spontaneously resolved but the symptoms continue to worsen and progress.  He has not eaten in the last 24 hours and given ongoing symptoms he came to the ER for further evaluation and treatment.  In the ER his vitals were stable.  Labs significant for a lipase of 4700.  Started on aggressive isotonic crystalloids, GI consulted and admitted for further evaluation and treatment.      Assessment/Plan:      43 year old male with PMH sig for asthma and history of chronic recurrent idiopathic pancreatitis last episode in 2017 presented with abdominal pain.     Acute pancreatitis, ?etiology  - NPO  - Aggressive isotonic crystalloids  - prn analgesics  - GI consult, defer imaging     Asthma, mild   - prn albuterol     FEN: NPO, PT/OT  Proph: SCDs, lovenox  Code status: Full code     Outpatient records or previous hospital records reviewed.   [facility] hospitalist to continue to follow patient while in house        Arline Cool MD  [facility]  Hospitalist  Message over [email or web address]      GI:    Date of Admission:   6/27/2024  Date of Consult:   6/27/2024     Reason for Consultation:     pancreatitis     History of Present Illness:  Michele Oconnor is a a(n) 43 year old male.      Hx of recurrent pancreatitis since 2012.  Has had ERCP with sphincterotomy and EUS in past.  Had pancreatitis 2012, 2014, 2017 (admitted), 2018, and now 2024     No alcohol use, no gallstones, no hypertriglyceridemia.  Has had genetic testing that was negative.  Has been told he has chronic pancreatitis.  Has IV dye allergy - itching without hives     No recent CT or MRI     In past used to see GI from [facility].     Admitted for 6 days of epigastric abdominal pain. Pain is similar to pancreatitis in past      Review of Systems:  Gastrointestinal: Denies positive test for blood stool, heartburn/indigestion/reflux, belching, difficulty swallowing, irregular bowel habits, painful swallowing, diarrhea, abdominal pain, constipation, nausea, incontinence of stool, vomiting, black stools, get full quickly at meals, blood in stools, abdominal distention, jaundice, flatulence, vomiting blood, bloating, hernia, laxative use, food/milk intolerance, pain with bowel movement, hemorrhoids.  General: Denies fatigue, chills/fever, night sweats, weight loss, loss of appetite, weight gain, sleep disturbance.  Neurological: Denies frequent headaches, history of stroke, recent passing out, recent dizziness, convulsions/seizures, dementia.  Cardiovascular: Denies history of heart murmur, leg swelling, history of rheumatic fever, pacemaker, chest pain or pressure after eating or when upset, automatic defibrillator, angina, other implanted devices, irregular heart rate/palpitations, high cholesterol or triglycerides, coronary stents.  Respiratory: Denies shortness of breath, chronic/frequent hoarseness, wheezing, exposure to tuberculosis, chronic cough, spitting up blood, cough up sputum, sleep apnea.  Genitourinary: Denies kidney stones, painful/difficult urination, frequent  urinary infections, frequent urination, blood in urine, incontinence, kidney failure, prostate problems.  Endocrine: Denies thyroid disease, denies diabetes.  Female complaints: Denies endometriosis, painful menstrual periods, heavy menstrual periods.  Patient is not pregnant.  Psychosocial: Denies history of mental illness, denies usually feeling lonely or depressed, denies history of depression, anxiety, history of physical or sexual abuse, stress, history of eating disorder.  Skin: Denies severe itching, unusual moles, rash, flushing, change in hair or nails.  Bone/joint: Denies arthritis, back pain, joint pain, osteoporosis.  Heme/Lymphatic: Denies easy bruising, anemia, excessive bleeding, enlarging or painful lymph nodes.  Allergy: Denies medication allergy, latex/rubber allergy, anaphylactic or other reaction to anesthesia, food allergy.   Eyes: Denies blurred/double vision, eye disease, glasses or contacts, glaucoma.  ENT: Denies nose or gums bleeding, mouth sores, bad breath or bad taste in mouth, hearing loss.  Physical Exam:    Blood pressure 114/64, pulse 64, temperature 97.4 °F (36.3 °C), temperature source Oral, resp. rate 16, height 5' 9\" (1.753 m), weight 175 lb (79.4 kg), SpO2 100%.     General: Appears alert, oriented x3 and in no acute distress.  HEENT: Normal. No neck vein distention. Thyroid not enlarged.  No lymphadenopathy.  CV: S1 and S2 normal.  No murmurs or gallops.  Lungs: Clear to auscultation.  Abdomen: Soft and nondistended.  Nontender.  No masses.  Bowel sounds are present.  Back: No CVA tenderness.  Extremities: No edema, cyanosis, or clubbing.  Skin: Warm     Laboratory Data:        Lab Results   Component Value Date     WBC 6.4 06/27/2024     HGB 15.5 06/27/2024     HCT 44.1 06/27/2024     .0 06/27/2024     CREATSERUM 0.92 06/27/2024     BUN 13 06/27/2024      06/27/2024     K 4.2 06/27/2024      06/27/2024     CO2 25.0 06/27/2024     GLU 90 06/27/2024     CA 9.4  06/27/2024     ALB 4.0 06/27/2024     ALKPHO 79 06/27/2024     BILT 1.0 06/27/2024     TP 7.9 06/27/2024     AST 17 06/27/2024     ALT 24 06/27/2024     LIP 4,759 06/27/2024         Imaging:        Assessment/Plan:     Recurrent pancreatitis.  With hx of chronic pancreatitis, need MRI to evaliate for neoplasm, pancreatic duct stricture or stone     FLD and advanced as tolerated to low fat     IVF at 125cc/h             Morris Chua MD  6/27/2024  7:22 PM                      6/28 HOSPITALIST:    SUBJECTIVE:  Tolerating CLD  Will be trying FLD for lunch   No nausea no vomiting abd pain minimal           AP:  43 year old male with PMH sig for asthma and history of chronic recurrent idiopathic pancreatitis last episode in 2017 presented with abdominal pain.     Acute pancreatitis, ?etiology  -  pt on diet, tolerating CLD, now on FLD   - Aggressive isotonic crystalloids  - prn analgesics  - GI consult, MRCP done pancreatis noted and possible pancreatic cyst - gi following      Asthma, mild   - prn albuterol        Proph:  , lovenox  Code status: Full code            Larissa Jimenes MD     MEDICATIONS ADMINISTERED IN LAST 1 DAY:  gadoterate meglumine (Dotarem) 10 MMOL/20ML injection 20 mL       Date Action Dose Route User    6/28/2024 0508 Given 16 mL Intravenous Tricia Cummings          HYDROmorphone (Dilaudid) 1 MG/ML injection 0.5 mg       Date Action Dose Route User    6/27/2024 1301 Given 0.5 mg Intravenous Crys Rosas RN          HYDROmorphone (Dilaudid) 1 MG/ML injection 0.4 mg       Date Action Dose Route User    6/28/2024 0726 Given 0.4 mg Intravenous Selene Paige RN    6/28/2024 0319 Given 0.4 mg Intravenous Elana Jovel RN    6/27/2024 2030 Given 0.4 mg Intravenous Elana Jovel RN    6/27/2024 1653 Given 0.4 mg Intravenous Prehn, Sarah, RN          ketorolac (Toradol) 15 MG/ML injection 15 mg       Date Action Dose Route User    6/27/2024 1301 Given 15 mg Intravenous Crys Rosas RN           lactated ringers infusion       Date Action Dose Route User    6/28/2024 0735 New Bag (none) Intravenous Selene Paige, RN    6/28/2024 0100 New Bag (none) Intravenous Elana Jovel RN    6/27/2024 2037 New Bag (none) Intravenous Elana Jovel RN    6/27/2024 1536 New Bag (none) Intravenous Prehn, Sarah, RN          sodium chloride 0.9 % IV bolus 1,000 mL       Date Action Dose Route User    6/27/2024 1301 New Bag 1,000 mL Intravenous Crys Rosas RN            Vitals (last day)       Date/Time Temp Pulse Resp BP SpO2 Weight O2 Device O2 Flow Rate (L/min) [facility]    06/28/24 0900 -- 88 15 -- -- -- -- --     06/28/24 0730 97.4 °F (36.3 °C) 78 17 120/69 98 % -- None (Room air) --     06/28/24 0600 98 °F (36.7 °C) 84 16 122/64 99 % -- None (Room air) -- LP    06/28/24 0130 97.6 °F (36.4 °C) 73 18 113/66 96 % -- None (Room air) -- LM    06/27/24 2029 98.5 °F (36.9 °C) 77 17 124/68 97 % -- None (Room air) -- LM    06/27/24 1650 97.4 °F (36.3 °C) -- -- -- -- -- -- --     06/27/24 1630 97.4 °F (36.3 °C) 64 16 114/64 100 % -- -- -- AC    06/27/24 1544 -- -- -- -- -- 175 lb -- -- SP    06/27/24 1431 97.6 °F (36.4 °C) 63 16 117/67 100 % -- None (Room air) -- AC    06/27/24 1343 -- 63 16 115/72 100 % -- None (Room air) -- AP    06/27/24 1343 97.7 °F (36.5 °C) -- -- -- -- -- -- --     06/27/24 1214 -- 70 18 123/70 98 % -- None (Room air) -- AP    06/27/24 1117 97.8 °F (36.6 °C) 71 16 144/79 98 % 175 lb None (Room air) -- BS

## 2024-06-28 NOTE — PROGRESS NOTES
[facility] Hospitalist Progress Note       SUBJECTIVE:  Tolerating CLD  Will be trying FLD for lunch   No nausea no vomiting abd pain minimal       OBJECTIVE:  Scheduled Meds:    enoxaparin  40 mg Subcutaneous Daily     Continuous Infusions:    lactated ringers 200 mL/hr at 06/28/24 0735     PRN Meds:   albuterol    melatonin    polyethylene glycol (PEG 3350)    sennosides    bisacodyl    fleet enema    ondansetron    prochlorperazine    acetaminophen **OR** HYDROcodone-acetaminophen **OR** HYDROcodone-acetaminophen    ketorolac    HYDROmorphone **OR** HYDROmorphone **OR** HYDROmorphone    Vitals  Vitals:    06/28/24 0900   BP:    Pulse: 88   Resp: 15   Temp:           Exam   Gen-    no acute distress, alert and oriented x 3   RESP-   Lungs CTA, normal respiratory effort  CV-      Heart RRR, no mgr  Abd-    soft, nondistended, nontender, bowel sounds present  Skin-    no rash  Neuro-  no focal neurologic deficits  Ext-      No edema in extremities   Psych- alert and oriented x 3     Labs:     Chem:  Recent Labs   Lab 06/27/24  1137 06/28/24  0742    136   K 4.2 4.0    106   CO2 25.0 21.0   BUN 13 13   MG  --  1.8   ALT 24 19   AST 17 12*   ALB 4.0 3.1*       HEM:  Recent Labs   Lab 06/27/24  1137 06/28/24  0742   WBC 6.4 6.6   HGB 15.5 13.3   .0 201.0   MCV 87.7 88.2       Coagulation:  Recent Labs   Lab 06/27/24  1137 06/28/24  0742   HCT 44.1 38.0*   HGB 15.5 13.3   .0 201.0          Urinalysis:   Recent Labs   Lab 06/27/24  1139   UROBILINOGEN 2*   NITRITE Negative            AP:  43 year old male with PMH sig for asthma and history of chronic recurrent idiopathic pancreatitis last episode in 2017 presented with abdominal pain.     Acute pancreatitis, ?etiology  -  pt on diet, tolerating CLD, now on FLD   - Aggressive isotonic crystalloids  - prn analgesics  - GI consult, MRCP done pancreatis noted and possible pancreatic cyst - gi following      Asthma, mild   - prn albuterol        Proph:  ,  lovenox  Code status: Full code          Larissa Jimenes MD

## 2024-06-28 NOTE — OCCUPATIONAL THERAPY NOTE
OT orders received, chart reviewed. PT met with pt yesterday who reports that he is independent and up ad quique and has no skilled therapy needs at this time. OT will DC as no skilled therapy goals are present.     Thank you for allowing me to care for this patient,   Marsha DÍAZ, OTR/L   Occupational Therapist   [facility]

## 2024-06-28 NOTE — CONSULTS
[facility]   part of [facility]    Report of Gastroenterology Consultation    Michele Oconnor Patient Status:  Inpatient    1980 MRN [id number]   Location [facility] 0SW-A Attending Travon, Acacia Nunn*   Hosp Day # 0 PCP Morgan Gallegos MD     Date of Admission:  2024  Date of Consult:   2024    Reason for Consultation:    pancreatitis    History of Present Illness:  Michele Oconnor is a a(n) 43 year old male.     Hx of recurrent pancreatitis since .  Has had ERCP with sphincterotomy and EUS in past.  Had pancreatitis 2012, , 2017 (admitted), 2018, and now     No alcohol use, no gallstones, no hypertriglyceridemia.  Has had genetic testing that was negative.  Has been told he has chronic pancreatitis.  Has IV dye allergy - itching without hives    No recent CT or MRI    In past used to see GI from [facility].    Admitted for 6 days of epigastric abdominal pain. Pain is similar to pancreatitis in past      History:  Past Medical History:    Asthma due to environmental allergies (HCC)    dust and cats    Chronic recurrent pancreatitis (HCC)    last episode . idiopathic, CFTR/PRSS1/SPINK1 - neg genetics 13. Rx Zenpep, lipase, PPI and Carafate in past. Neg IgG4 and anti tTGAb, MR  neg    Erectile dysfunction    difficulty maintaining erection - 10 mg Cialis prn. Dr. Cason    GERD (gastroesophageal reflux disease)    Mild intermittent asthma (HCC)    albuterol MDI prn - years since used     Past Surgical History:   Procedure Laterality Date    Egd  2012    [facility] , Endoscopic US- neg    Ercp,diagnostic  2012    Dr. Lofton at [facility] - nml    Excision of lesion, eyelid - os - left eye Left 2021    Dr. Correa    Joint aspiration/injection Left 2021    Dr. Mcguire    Other surgical history      sphincterectomy for pancreatitis    Lorenzo teeth removed      4     Family History   Problem Relation Age of Onset    Hypertension Father     Thyroid  Disorder Mother     No Known Problems Daughter     No Known Problems Son     Dementia Maternal Grandmother     Melanoma Maternal Grandmother     Gastro-Intestinal Disorder Maternal Grandfather         celiac disease    Prostate Cancer Maternal Grandfather     No Known Problems Paternal Grandmother     Heart Disorder Paternal Grandfather     Colon Cancer Paternal Grandfather 50    No Known Problems Brother       reports that he has never smoked. He has never used smokeless tobacco. He reports current alcohol use. He reports that he does not use drugs.    Allergies:  Allergies   Allergen Reactions    Radiology Contrast Iodinated Dyes RASH       Medications:    Current Facility-Administered Medications:     albuterol (Ventolin HFA) 108 (90 Base) MCG/ACT inhaler 2 puff, 2 puff, Inhalation, Q6H PRN    melatonin tab 3 mg, 3 mg, Oral, Nightly PRN    polyethylene glycol (PEG 3350) (Miralax) 17 g oral packet 17 g, 17 g, Oral, Daily PRN    sennosides (Senokot) tab 17.2 mg, 17.2 mg, Oral, Nightly PRN    bisacodyl (Dulcolax) 10 MG rectal suppository 10 mg, 10 mg, Rectal, Daily PRN    fleet enema (Fleet) 7-19 GM/118ML rectal enema 133 mL, 1 enema, Rectal, Once PRN    ondansetron (Zofran) 4 MG/2ML injection 4 mg, 4 mg, Intravenous, Q6H PRN    prochlorperazine (Compazine) 10 MG/2ML injection 5 mg, 5 mg, Intravenous, Q8H PRN    lactated ringers infusion, , Intravenous, Continuous    enoxaparin (Lovenox) 40 MG/0.4ML SUBQ injection 40 mg, 40 mg, Subcutaneous, Daily    acetaminophen (Tylenol) tab 650 mg, 650 mg, Oral, Q4H PRN **OR** HYDROcodone-acetaminophen (Norco) 5-325 MG per tab 1 tablet, 1 tablet, Oral, Q4H PRN **OR** HYDROcodone-acetaminophen (Norco) 5-325 MG per tab 2 tablet, 2 tablet, Oral, Q4H PRN    ketorolac (Toradol) 15 MG/ML injection 15 mg, 15 mg, Intravenous, Q6H PRN    HYDROmorphone (Dilaudid) 1 MG/ML injection 0.2 mg, 0.2 mg, Intravenous, Q2H PRN **OR** HYDROmorphone (Dilaudid) 1 MG/ML injection 0.4 mg, 0.4 mg,  Intravenous, Q2H PRN **OR** HYDROmorphone (Dilaudid) 1 MG/ML injection 0.8 mg, 0.8 mg, Intravenous, Q2H PRN    Review of Systems:  Gastrointestinal: Denies positive test for blood stool, heartburn/indigestion/reflux, belching, difficulty swallowing, irregular bowel habits, painful swallowing, diarrhea, abdominal pain, constipation, nausea, incontinence of stool, vomiting, black stools, get full quickly at meals, blood in stools, abdominal distention, jaundice, flatulence, vomiting blood, bloating, hernia, laxative use, food/milk intolerance, pain with bowel movement, hemorrhoids.  General: Denies fatigue, chills/fever, night sweats, weight loss, loss of appetite, weight gain, sleep disturbance.  Neurological: Denies frequent headaches, history of stroke, recent passing out, recent dizziness, convulsions/seizures, dementia.  Cardiovascular: Denies history of heart murmur, leg swelling, history of rheumatic fever, pacemaker, chest pain or pressure after eating or when upset, automatic defibrillator, angina, other implanted devices, irregular heart rate/palpitations, high cholesterol or triglycerides, coronary stents.  Respiratory: Denies shortness of breath, chronic/frequent hoarseness, wheezing, exposure to tuberculosis, chronic cough, spitting up blood, cough up sputum, sleep apnea.  Genitourinary: Denies kidney stones, painful/difficult urination, frequent urinary infections, frequent urination, blood in urine, incontinence, kidney failure, prostate problems.  Endocrine: Denies thyroid disease, denies diabetes.  Female complaints: Denies endometriosis, painful menstrual periods, heavy menstrual periods.  Patient is not pregnant.  Psychosocial: Denies history of mental illness, denies usually feeling lonely or depressed, denies history of depression, anxiety, history of physical or sexual abuse, stress, history of eating disorder.  Skin: Denies severe itching, unusual moles, rash, flushing, change in hair or  nails.  Bone/joint: Denies arthritis, back pain, joint pain, osteoporosis.  Heme/Lymphatic: Denies easy bruising, anemia, excessive bleeding, enlarging or painful lymph nodes.  Allergy: Denies medication allergy, latex/rubber allergy, anaphylactic or other reaction to anesthesia, food allergy.   Eyes: Denies blurred/double vision, eye disease, glasses or contacts, glaucoma.  ENT: Denies nose or gums bleeding, mouth sores, bad breath or bad taste in mouth, hearing loss.  Physical Exam:    Blood pressure 114/64, pulse 64, temperature 97.4 °F (36.3 °C), temperature source Oral, resp. rate 16, height 5' 9\" (1.753 m), weight 175 lb (79.4 kg), SpO2 100%.    General: Appears alert, oriented x3 and in no acute distress.  HEENT: Normal. No neck vein distention. Thyroid not enlarged.  No lymphadenopathy.  CV: S1 and S2 normal.  No murmurs or gallops.  Lungs: Clear to auscultation.  Abdomen: Soft and nondistended.  Nontender.  No masses.  Bowel sounds are present.  Back: No CVA tenderness.  Extremities: No edema, cyanosis, or clubbing.  Skin: Warm    Laboratory Data:  Lab Results   Component Value Date    WBC 6.4 06/27/2024    HGB 15.5 06/27/2024    HCT 44.1 06/27/2024    .0 06/27/2024    CREATSERUM 0.92 06/27/2024    BUN 13 06/27/2024     06/27/2024    K 4.2 06/27/2024     06/27/2024    CO2 25.0 06/27/2024    GLU 90 06/27/2024    CA 9.4 06/27/2024    ALB 4.0 06/27/2024    ALKPHO 79 06/27/2024    BILT 1.0 06/27/2024    TP 7.9 06/27/2024    AST 17 06/27/2024    ALT 24 06/27/2024    LIP 4,759 06/27/2024       Imaging:       Assessment/Plan:    Recurrent pancreatitis.  With hx of chronic pancreatitis, need MRI to evaliate for neoplasm, pancreatic duct stricture or stone    FLD and advanced as tolerated to low fat    IVF at 125cc/h        Patient Active Problem List   Diagnosis    Acute pancreatitis (HCC)    Acute pancreatitis, unspecified complication status, unspecified pancreatitis type (HCC)    Chronic  recurrent pancreatitis (HCC)    Extrinsic asthma (HCC)    Mild intermittent asthma (HCC)    Erectile dysfunction    Idiopathic acute pancreatitis without infection or necrosis (HCC)             Morris Chua MD  6/27/2024  7:22 PM

## 2024-06-28 NOTE — PLAN OF CARE
Received patient in bed, room #0014, awake alert x 4.  Has moderate abdominal pain and mild headache. Dilaudid IV given w/ good result.  On room air, clear lungs.  Normal sinus rhythm on telemetry.  Electrolyte protocol, K 4.2  SCD  IV fluids.  OT to eval and treat.  Daily weights.  MRI abdomen and MRCP today    Problem: Patient/Family Goals  Goal: Patient/Family Long Term Goal  Description: Patient's Long Term Goal: dc home    Interventions:  - Daily weights  - Telemetry  - Non-cardiac electrolyte protocol  - monitor VS, labs, intake and output  - O2 protocol  - SCD and Lovenox for VTE  - Pain management  - AmbulateTID, Up in the chair TID w/ meals  - See additional Care Plan goals for specific interventions  Outcome: Progressing  Goal: Patient/Family Short Term Goal  Description: Patient's Short Term Goal: VS stable    Interventions:   - Daily weights  - Telemetry  - Non-cardiac electrolyte protocol  - monitor VS, labs, intake and output  - O2 protocol  - SCD and Lovenox for VTE  - Pain management  - AmbulateTID, Up in the chair TID w/ meals    - See additional Care Plan goals for specific interventions  Outcome: Progressing     Problem: CARDIOVASCULAR - ADULT  Goal: Absence of cardiac arrhythmias or at baseline  Description: INTERVENTIONS:  - Continuous cardiac monitoring, monitor vital signs, obtain 12 lead EKG if indicated  - Evaluate effectiveness of antiarrhythmic and heart rate control medications as ordered  - Initiate emergency measures for life threatening arrhythmias  - Monitor electrolytes and administer replacement therapy as ordered  Outcome: Progressing     Problem: METABOLIC/FLUID AND ELECTROLYTES - ADULT  Goal: Electrolytes maintained within normal limits  Description: INTERVENTIONS:  - Monitor labs and rhythm and assess patient for signs and symptoms of electrolyte imbalances  - Administer electrolyte replacement as ordered  - Monitor response to electrolyte replacements, including rhythm and  repeat lab results as appropriate    Outcome: Progressing

## 2024-06-29 VITALS
OXYGEN SATURATION: 100 % | TEMPERATURE: 98 F | HEART RATE: 62 BPM | WEIGHT: 175.13 LBS | BODY MASS INDEX: 25.94 KG/M2 | HEIGHT: 69 IN | SYSTOLIC BLOOD PRESSURE: 127 MMHG | RESPIRATION RATE: 20 BRPM | DIASTOLIC BLOOD PRESSURE: 73 MMHG

## 2024-06-29 LAB
ALBUMIN SERPL-MCNC: 2.8 G/DL (ref 3.4–5)
ALBUMIN/GLOB SERPL: 0.9 {RATIO} (ref 1–2)
ALP LIVER SERPL-CCNC: 59 U/L
ALT SERPL-CCNC: 18 U/L
ANION GAP SERPL CALC-SCNC: 6 MMOL/L (ref 0–18)
AST SERPL-CCNC: 12 U/L (ref 15–37)
BASOPHILS # BLD AUTO: 0.02 X10(3) UL (ref 0–0.2)
BASOPHILS NFR BLD AUTO: 0.4 %
BILIRUB SERPL-MCNC: 0.6 MG/DL (ref 0.1–2)
BUN BLD-MCNC: 7 MG/DL (ref 9–23)
CALCIUM BLD-MCNC: 8.3 MG/DL (ref 8.5–10.1)
CHLORIDE SERPL-SCNC: 111 MMOL/L (ref 98–112)
CO2 SERPL-SCNC: 26 MMOL/L (ref 21–32)
CREAT BLD-MCNC: 0.78 MG/DL
EGFRCR SERPLBLD CKD-EPI 2021: 114 ML/MIN/1.73M2 (ref 60–?)
EOSINOPHIL # BLD AUTO: 0.25 X10(3) UL (ref 0–0.7)
EOSINOPHIL NFR BLD AUTO: 4.7 %
ERYTHROCYTE [DISTWIDTH] IN BLOOD BY AUTOMATED COUNT: 11.9 %
GLOBULIN PLAS-MCNC: 3.2 G/DL (ref 2.8–4.4)
GLUCOSE BLD-MCNC: 87 MG/DL (ref 70–99)
HCT VFR BLD AUTO: 37.6 %
HGB BLD-MCNC: 12.8 G/DL
IMM GRANULOCYTES # BLD AUTO: 0.02 X10(3) UL (ref 0–1)
IMM GRANULOCYTES NFR BLD: 0.4 %
LYMPHOCYTES # BLD AUTO: 1.44 X10(3) UL (ref 1–4)
LYMPHOCYTES NFR BLD AUTO: 27.2 %
MCH RBC QN AUTO: 30.5 PG (ref 26–34)
MCHC RBC AUTO-ENTMCNC: 34 G/DL (ref 31–37)
MCV RBC AUTO: 89.5 FL
MONOCYTES # BLD AUTO: 0.62 X10(3) UL (ref 0.1–1)
MONOCYTES NFR BLD AUTO: 11.7 %
NEUTROPHILS # BLD AUTO: 2.94 X10 (3) UL (ref 1.5–7.7)
NEUTROPHILS # BLD AUTO: 2.94 X10(3) UL (ref 1.5–7.7)
NEUTROPHILS NFR BLD AUTO: 55.6 %
OSMOLALITY SERPL CALC.SUM OF ELEC: 293 MOSM/KG (ref 275–295)
PLATELET # BLD AUTO: 217 10(3)UL (ref 150–450)
POTASSIUM SERPL-SCNC: 4.1 MMOL/L (ref 3.5–5.1)
PROT SERPL-MCNC: 6 G/DL (ref 6.4–8.2)
RBC # BLD AUTO: 4.2 X10(6)UL
SODIUM SERPL-SCNC: 143 MMOL/L (ref 136–145)
WBC # BLD AUTO: 5.3 X10(3) UL (ref 4–11)

## 2024-06-29 PROCEDURE — 80053 COMPREHEN METABOLIC PANEL: CPT | Performed by: INTERNAL MEDICINE

## 2024-06-29 PROCEDURE — 85025 COMPLETE CBC W/AUTO DIFF WBC: CPT | Performed by: INTERNAL MEDICINE

## 2024-06-29 RX ORDER — HYDROCODONE BITARTRATE AND ACETAMINOPHEN 5; 325 MG/1; MG/1
1 TABLET ORAL EVERY 8 HOURS PRN
Qty: 20 TABLET | Refills: 0 | Status: SHIPPED | OUTPATIENT
Start: 2024-06-29

## 2024-06-29 NOTE — PLAN OF CARE
Received pt alert and orientated x4. VSS. No sob on RA. Afebrile. C/o mild pain after eating. PRN given with good relief. Up and voiding. Fall precautions in place, call light within reach.     1430: Pt cleared for DC. IV removed. DC paperwork provided, pt verbalized understanding. All pt belongings gathered and sent with the pt.     NURSING DISCHARGE NOTE    Discharged Home via Ambulatory.  Accompanied by RN  Belongings Taken by patient/family.    Problem: CARDIOVASCULAR - ADULT  Goal: Absence of cardiac arrhythmias or at baseline  Description: INTERVENTIONS:  - Continuous cardiac monitoring, monitor vital signs, obtain 12 lead EKG if indicated  - Evaluate effectiveness of antiarrhythmic and heart rate control medications as ordered  - Initiate emergency measures for life threatening arrhythmias  - Monitor electrolytes and administer replacement therapy as ordered  Outcome: Progressing     Problem: METABOLIC/FLUID AND ELECTROLYTES - ADULT  Goal: Electrolytes maintained within normal limits  Description: INTERVENTIONS:  - Monitor labs and rhythm and assess patient for signs and symptoms of electrolyte imbalances  - Administer electrolyte replacement as ordered  - Monitor response to electrolyte replacements, including rhythm and repeat lab results as appropriate    Outcome: Progressing

## 2024-06-29 NOTE — PROGRESS NOTES
Alert and orientated x4.  Ambulatory in room.  Medicated with dilaudid twice for abdominal pain-once after eating and again upon waking in the morning.  Voiding without difficulty.  Afebrile.  Vital signs stable.  No complaints of nausea.

## 2024-06-29 NOTE — DISCHARGE INSTRUCTIONS
You have a pancreatic cyst this needs further evaluation as outpatient, please follow up w the stomach doctor regarding this.

## 2024-06-29 NOTE — DISCHARGE SUMMARY
[facility] Hospitalist Discharge Summary    Patient ID  Michele Oconnor  [id number]  43 year old  11/6/1980  Admit date: 6/27/2024  Discharge date: 6/29/2024  Attending: Larissa Jimenes MD   Primary Care Physician: Morgan Gallegos MD    Reason for admission:   (see HPI on HP for further detail)  Discharge condition: stable  Disposition: home    Important follow up:  -PCP    -specialists: GI          Discharge med list     Medication List        START taking these medications      HYDROcodone-acetaminophen 5-325 MG Tabs  Commonly known as: Norco  Take 1 tablet by mouth every 8 (eight) hours as needed.            CONTINUE taking these medications      albuterol 108 (90 Base) MCG/ACT Aers  Commonly known as: Ventolin HFA  TAKE 2 PUFFS BY MOUTH EVERY 6 HOURS AS NEEDED FOR WHEEZE OR SHORTNESS OF BREATH     hydrocortisone 2.5 % Crea  Apply to affected areas as needed  - consider 7 days at a time     Mometasone Furoate 0.1 % Crea  Commonly known as: ELOCON  Apply to affected areas twice daily as needed     Tadalafil 20 MG Tabs               Where to Get Your Medications        You can get these medications from any pharmacy    Bring a paper prescription for each of these medications  HYDROcodone-acetaminophen 5-325 MG Tabs         Discharge Diagnoses/Hospital course:   43 year old male with PMH sig for asthma and history of chronic recurrent idiopathic pancreatitis last episode in 2017 presented with abdominal pain.     Acute pancreatitis, ?etiology  - pt tolerating diet this AM   - prn analgesics - discussed w pt the risks of narcotics  - GI consult, MRCP done pancreatis noted and possible pancreatic cyst -d/w pt and advised Gi follow up.  gi following      Asthma, mild   - prn albuterol       Consults:  IP CONSULT TO HOSPITALIST  IP CONSULT TO GASTROENTEROLOGY    Radiology:  MRI ABDOMEN AND MRCP W/3D (W+W0)(CPT=74183/26689)    Result Date: 6/28/2024  PROCEDURE:  MRI ABDOMEN&MRCP W/3D (W+W0)(CPT=74183/11939)  COMPARISON:  [facility]  , CT, CT ABDOMEN+PELVIS(CPT=74176), 12/31/2015, 7:38 PM.  INDICATIONS:  acute pancreatitis  TECHNIQUE:  Multiplanar single shot fast spin echo, chemical shift imaging, breath hold T2 weighted images and 2-D fiesta sequences were acquired. Fluid sensitive imaging with MRCP reconstruction was also performed including 3D multi-projection imaging (non independent workstation).  Both projection and source MRCP images are evaluated.  Subsequent post contrast imaging was performed after intravenous administration of paramagnetic contrast agent.  3-D RENDERING:   PATIENT STATED HISTORY:(As transcribed by Technologist)  Pt complaining of having epigastric abdominal pain.   CONTRAST USED:  16mL of Dotarem  FINDINGS:  LIVER:  No enlargement, atrophy, abnormal density, or significant focal lesion.  BILIARY:  No visible dilatation or filling defect.  PANCREAS:  Trace edema noted along the dorsal pancreatic tail (series 6, image 17).  No significant interstitial edema.  No organized regional fluid collections.  Suspected variant pancreatic ductal anatomy with branching of the main pancreatic duct at the level of the uncinate process.  There is a lobular cystic lesion of the uncinate process measuring 2.3 x 1.6 x 1.0 cm abutting and likely communicating with a nondilated ductal branch.  No enhancing components.  No upstream parenchymal atrophy. SPLEEN:  No enlargement or focal lesion.  KIDNEYS:  No mass or obstruction.  ADRENALS:  No mass or enlargement.  AORTA/VASCULAR:  No aneurysm or dissection.  RETROPERITONEUM:  No mass or adenopathy.  BOWEL/MESENTERY:  No visible mass, obstruction, or bowel wall thickening.  ABDOMINAL WALL:  No mass or hernia.  BONES:  No bony lesion or fracture.  LUNG BASES:  No visible pleural disease.  Lung bases not well assessed with MRI.  OTHER:  Negative.             CONCLUSION:   1. Trace fluid along the dorsal pancreatic tail may represent acute pancreatitis.  No evidence of glandular necrosis.  No  organized peripancreatic fluid collections.   2. Normal caliber of the main pancreatic duct, though with suspected variant ductal anatomy characterized by branching of the main pancreatic duct at the level of the uncinate process.  There is a nonenhancing lobular cystic lesion of the uncinate process 0 (2.3 x 1.6 x 1.0 cm), abutting the adjacent ductal segment.  This could represent an intrapancreatic pseudocyst, particularly if there have been prior episodes of pancreatitis.  Alternatively, this could represent cystic neoplasm such as IPMN.  Recommend correlation with prior ERCP/EUS findings.  LOCATION:  [facility]    Dictated by (CST): Justin Glass MD on 6/28/2024 at 8:18 AM     Finalized by (CST): Justin Glass MD on 6/28/2024 at 8:35 AM         Operative reports:          Day of discharge exam:  Vitals:    06/29/24 0820   BP: 127/73   Pulse: 62   Resp: 20   Temp: 97.9 °F (36.6 °C)     No acute distress, alert a   Lungs clear  Heart regular  Abdomen benign     Patient and/or family had opportunity to ask questions and expressed understanding and agreement with therapeutic plan as outlined      50 minutes spent on discharge    Larissa Jimenes MD

## 2024-06-29 NOTE — PROGRESS NOTES
[facility] Hospitalist Progress Note       SUBJECTIVE:   Pt had some abd pain last night and required dilaudid     This am feels well tolerating diet     OBJECTIVE:  Scheduled Meds:    enoxaparin  40 mg Subcutaneous Daily     Continuous Infusions:    lactated ringers 125 mL/hr at 06/29/24 0430     PRN Meds:   albuterol    melatonin    polyethylene glycol (PEG 3350)    sennosides    bisacodyl    fleet enema    ondansetron    prochlorperazine    acetaminophen **OR** HYDROcodone-acetaminophen **OR** HYDROcodone-acetaminophen    ketorolac    HYDROmorphone **OR** HYDROmorphone **OR** HYDROmorphone    Vitals  Vitals:    06/29/24 0820   BP: 127/73   Pulse: 62   Resp: 20   Temp: 97.9 °F (36.6 °C)         Exam   Gen-    no acute distress, alert and oriented x 3   RESP-   Lungs CTA, normal respiratory effort  CV-      Heart RRR, no mgr  Abd-    soft, nondistended, nontender, bowel sounds present  Skin-    no rash  Neuro-  no focal neurologic deficits  Ext-      No edema in extremities   Psych- alert and oriented x 3     Labs:     Chem:  Recent Labs   Lab 06/27/24 1137 06/28/24  0742 06/29/24  0720    136 143   K 4.2 4.0 4.1    106 111   CO2 25.0 21.0 26.0   BUN 13 13 7*   MG  --  1.8  --    ALT 24 19 18   AST 17 12* 12*   ALB 4.0 3.1* 2.8*       HEM:  Recent Labs   Lab 06/27/24 1137 06/28/24  0742 06/29/24  0720   WBC 6.4 6.6 5.3   HGB 15.5 13.3 12.8*   .0 201.0 217.0   MCV 87.7 88.2 89.5       Coagulation:  Recent Labs   Lab 06/27/24 1137 06/28/24  0742 06/29/24  0720   HCT 44.1 38.0* 37.6*   HGB 15.5 13.3 12.8*   .0 201.0 217.0       Cardiac:  No results for input(s): \"CKMB\" in the last 168 hours.    Invalid input(s): \"CKTOTAL\", \"CKMBINDEX\", \"TROPONINI\"    Urinalysis:   Recent Labs   Lab 06/27/24  1139   UROBILINOGEN 2*   NITRITE Negative            AP:  43 year old male with PMH sig for asthma and history of chronic recurrent idiopathic pancreatitis last episode in 2017 presented with abdominal  pain.     Acute pancreatitis, ?etiology  - pt tolerating diet this AM   - Aggressive isotonic crystalloids  - prn analgesics  - GI consult, MRCP done pancreatis noted and possible pancreatic cyst - gi following      Asthma, mild   - prn albuterol        Proph:  , lovenox  Code status: Full code            Larissa Jimenes MD

## 2024-07-01 NOTE — PAYOR COMM NOTE
--------------  DISCHARGE REVIEW    Payor: [facility] OUT OF STATE PPO  Subscriber #:  [id number]  Authorization Number: [id number]    Admit date: 6/27/24  Admit time:   2:18 PM  Discharge Date: 6/29/2024  2:30 PM     Admitting Physician: Acacia Cool MD  Attending Physician:  No att. providers found  Primary Care Physician: Morgan Gallegos MD          Discharge Summary Notes        Discharge Summary signed by Larissa Jimenes MD at 6/30/2024  1:34 PM       Author: Larissa Jimenes MD Specialty: HOSPITALIST, Internal Medicine Author Type: Physician    Filed: 6/30/2024  1:34 PM Date of Service: 6/29/2024 11:59 AM Status: Signed    : Larissa Jimenes MD (Physician)         [facility] Hospitalist Discharge Summary    Patient ID  Michele Oconnor  [id number]  43 year old  11/6/1980  Admit date: 6/27/2024  Discharge date: 6/29/2024  Attending: Larissa Jimenes MD   Primary Care Physician: Morgan Gallegos MD    Reason for admission:   (see HPI on HP for further detail)  Discharge condition: stable  Disposition: home    Important follow up:  -PCP    -specialists: GI          Discharge med list     Medication List        START taking these medications      HYDROcodone-acetaminophen 5-325 MG Tabs  Commonly known as: Norco  Take 1 tablet by mouth every 8 (eight) hours as needed.            CONTINUE taking these medications      albuterol 108 (90 Base) MCG/ACT Aers  Commonly known as: Ventolin HFA  TAKE 2 PUFFS BY MOUTH EVERY 6 HOURS AS NEEDED FOR WHEEZE OR SHORTNESS OF BREATH     hydrocortisone 2.5 % Crea  Apply to affected areas as needed  - consider 7 days at a time     Mometasone Furoate 0.1 % Crea  Commonly known as: ELOCON  Apply to affected areas twice daily as needed     Tadalafil 20 MG Tabs               Where to Get Your Medications        You can get these medications from any pharmacy    Bring a paper prescription for each of these medications  HYDROcodone-acetaminophen 5-325 MG Tabs         Discharge Diagnoses/Hospital  course:   43 year old male with PMH sig for asthma and history of chronic recurrent idiopathic pancreatitis last episode in 2017 presented with abdominal pain.     Acute pancreatitis, ?etiology  - pt tolerating diet this AM   - prn analgesics - discussed w pt the risks of narcotics  - GI consult, MRCP done pancreatis noted and possible pancreatic cyst -d/w pt and advised Gi follow up.  gi following      Asthma, mild   - prn albuterol       Consults:  IP CONSULT TO HOSPITALIST  IP CONSULT TO GASTROENTEROLOGY    Radiology:  MRI ABDOMEN AND MRCP W/3D (W+W0)(CPT=74183/78976)    Result Date: 6/28/2024  PROCEDURE:  MRI ABDOMEN&MRCP W/3D (W+W0)(CPT=74183/37862)  COMPARISON:  [facility] , CT, CT ABDOMEN+PELVIS(CPT=74176), 12/31/2015, 7:38 PM.  INDICATIONS:  acute pancreatitis  TECHNIQUE:  Multiplanar single shot fast spin echo, chemical shift imaging, breath hold T2 weighted images and 2-D fiesta sequences were acquired. Fluid sensitive imaging with MRCP reconstruction was also performed including 3D multi-projection imaging (non independent workstation).  Both projection and source MRCP images are evaluated.  Subsequent post contrast imaging was performed after intravenous administration of paramagnetic contrast agent.  3-D RENDERING:   PATIENT STATED HISTORY:(As transcribed by Technologist)  Pt complaining of having epigastric abdominal pain.   CONTRAST USED:  16mL of Dotarem  FINDINGS:  LIVER:  No enlargement, atrophy, abnormal density, or significant focal lesion.  BILIARY:  No visible dilatation or filling defect.  PANCREAS:  Trace edema noted along the dorsal pancreatic tail (series 6, image 17).  No significant interstitial edema.  No organized regional fluid collections.  Suspected variant pancreatic ductal anatomy with branching of the main pancreatic duct at the level of the uncinate process.  There is a lobular cystic lesion of the uncinate process measuring 2.3 x 1.6 x 1.0 cm abutting and likely communicating with a  nondilated ductal branch.  No enhancing components.  No upstream parenchymal atrophy. SPLEEN:  No enlargement or focal lesion.  KIDNEYS:  No mass or obstruction.  ADRENALS:  No mass or enlargement.  AORTA/VASCULAR:  No aneurysm or dissection.  RETROPERITONEUM:  No mass or adenopathy.  BOWEL/MESENTERY:  No visible mass, obstruction, or bowel wall thickening.  ABDOMINAL WALL:  No mass or hernia.  BONES:  No bony lesion or fracture.  LUNG BASES:  No visible pleural disease.  Lung bases not well assessed with MRI.  OTHER:  Negative.             CONCLUSION:   1. Trace fluid along the dorsal pancreatic tail may represent acute pancreatitis.  No evidence of glandular necrosis.  No organized peripancreatic fluid collections.   2. Normal caliber of the main pancreatic duct, though with suspected variant ductal anatomy characterized by branching of the main pancreatic duct at the level of the uncinate process.  There is a nonenhancing lobular cystic lesion of the uncinate process 0 (2.3 x 1.6 x 1.0 cm), abutting the adjacent ductal segment.  This could represent an intrapancreatic pseudocyst, particularly if there have been prior episodes of pancreatitis.  Alternatively, this could represent cystic neoplasm such as IPMN.  Recommend correlation with prior ERCP/EUS findings.  LOCATION:  [facility]    Dictated by (CST): Justin Glass MD on 6/28/2024 at 8:18 AM     Finalized by (CST): Justin Glass MD on 6/28/2024 at 8:35 AM         Operative reports:          Day of discharge exam:  Vitals:    06/29/24 0820   BP: 127/73   Pulse: 62   Resp: 20   Temp: 97.9 °F (36.6 °C)     No acute distress, alert a   Lungs clear  Heart regular  Abdomen benign     Patient and/or family had opportunity to ask questions and expressed understanding and agreement with therapeutic plan as outlined      50 minutes spent on discharge    Larissa Jimenes MD    Electronically signed by Larissa Jimenes MD on 6/30/2024  1:34 PM         REVIEWER COMMENTS

## 2024-11-12 ENCOUNTER — HOSPITAL ENCOUNTER (OUTPATIENT)
Facility: HOSPITAL | Age: 44
Setting detail: HOSPITAL OUTPATIENT SURGERY
Discharge: HOME | End: 2024-11-12
Attending: INTERNAL MEDICINE | Admitting: INTERNAL MEDICINE
Payer: COMMERCIAL

## 2024-11-12 ENCOUNTER — ANESTHESIA (OUTPATIENT)
Dept: ENDOSCOPY | Facility: HOSPITAL | Age: 44
End: 2024-11-12
Payer: COMMERCIAL

## 2024-11-12 ENCOUNTER — ANESTHESIA EVENT (OUTPATIENT)
Dept: ENDOSCOPY | Facility: HOSPITAL | Age: 44
End: 2024-11-12
Payer: COMMERCIAL

## 2024-11-12 VITALS
RESPIRATION RATE: 18 BRPM | SYSTOLIC BLOOD PRESSURE: 128 MMHG | DIASTOLIC BLOOD PRESSURE: 74 MMHG | TEMPERATURE: 98 F | HEIGHT: 69 IN | WEIGHT: 175 LBS | HEART RATE: 92 BPM | BODY MASS INDEX: 25.92 KG/M2 | OXYGEN SATURATION: 99 %

## 2024-11-12 PROCEDURE — 0DJ08ZZ INSPECTION OF UPPER INTESTINAL TRACT, VIA NATURAL OR ARTIFICIAL OPENING ENDOSCOPIC: ICD-10-PCS | Performed by: INTERNAL MEDICINE

## 2024-11-12 RX ORDER — SODIUM CHLORIDE, SODIUM LACTATE, POTASSIUM CHLORIDE, CALCIUM CHLORIDE 600; 310; 30; 20 MG/100ML; MG/100ML; MG/100ML; MG/100ML
INJECTION, SOLUTION INTRAVENOUS CONTINUOUS
Status: DISCONTINUED | OUTPATIENT
Start: 2024-11-12 | End: 2024-11-12

## 2024-11-12 RX ORDER — NALOXONE HYDROCHLORIDE 0.4 MG/ML
80 INJECTION, SOLUTION INTRAMUSCULAR; INTRAVENOUS; SUBCUTANEOUS AS NEEDED
Status: DISCONTINUED | OUTPATIENT
Start: 2024-11-12 | End: 2024-11-12

## 2024-11-12 RX ORDER — LIDOCAINE HYDROCHLORIDE 10 MG/ML
INJECTION, SOLUTION EPIDURAL; INFILTRATION; INTRACAUDAL; PERINEURAL AS NEEDED
Status: DISCONTINUED | OUTPATIENT
Start: 2024-11-12 | End: 2024-11-12 | Stop reason: SURG

## 2024-11-12 RX ORDER — LEVOFLOXACIN 5 MG/ML
INJECTION, SOLUTION INTRAVENOUS
Status: DISCONTINUED
Start: 2024-11-12 | End: 2024-11-12 | Stop reason: WASHOUT

## 2024-11-12 RX ORDER — LEVOFLOXACIN 5 MG/ML
500 INJECTION, SOLUTION INTRAVENOUS ONCE
Status: DISCONTINUED | OUTPATIENT
Start: 2024-11-12 | End: 2024-11-12

## 2024-11-12 RX ADMIN — LIDOCAINE HYDROCHLORIDE 50 MG: 10 INJECTION, SOLUTION EPIDURAL; INFILTRATION; INTRACAUDAL; PERINEURAL at 16:06:00

## 2024-11-12 RX ADMIN — SODIUM CHLORIDE, SODIUM LACTATE, POTASSIUM CHLORIDE, CALCIUM CHLORIDE: 600; 310; 30; 20 INJECTION, SOLUTION INTRAVENOUS at 16:20:00

## 2024-11-12 NOTE — ANESTHESIA POSTPROCEDURE EVALUATION
[facility]    Michele Oconnor Patient Status:  Hospital Outpatient Surgery   Age/Gender 44 year old male MRN [id number]   Location [facility] ENDOSCOPY [facility]IN [facility] Attending Morris Chua MD   Hosp Day # 0 PCP Morgan Gallegos MD       Anesthesia Post-op Note    UPER ENDOSCOPIC ULTRASOUND (EUS)    Procedure Summary       Date: 11/12/24 Room / Location:  ENDOSCOPY 04 /  ENDOSCOPY    Anesthesia Start: 1601 Anesthesia Stop:     Procedure: UPER ENDOSCOPIC ULTRASOUND (EUS) Diagnosis: (pancreas cyst)    Surgeons: Morris Chua MD Anesthesiologist: Zi Voss MD    Anesthesia Type: MAC ASA Status: 2            Anesthesia Type: MAC    Vitals Value Taken Time   /67 11/12/24 1619   Temp  11/12/24 1620   Pulse 99 11/12/24 1619   Resp 16 11/12/24 1620   SpO2 95 % 11/12/24 1619   Vitals shown include unfiled device data.    Patient Location: Endoscopy    Anesthesia Type: MAC    Airway Patency: patent    Postop Pain Control: adequate    Mental Status: mildly sedated but able to meaningfully participate in the post-anesthesia evaluation    Nausea/Vomiting: none    Cardiopulmonary/Hydration status: stable euvolemic    Complications: no apparent anesthesia related complications    Postop vital signs: stable    Dental Exam: Unchanged from Preop    Patient to be discharged home when criteria met.

## 2024-11-12 NOTE — DISCHARGE INSTRUCTIONS
Home Care Instructions for Gastroscopy with Sedation    Diet:  - Resume your regular diet as tolerated unless otherwise instructed.  - Start with light meals to minimize bloating.  - Do not drink alcohol today.    Medication:  - If you have questions about resuming your normal medications, please contact your Primary Care Physician.    Activities:  - Take it easy today. Do not return to work today.  - Do not drive today.  - Do not operate any machinery today (including kitchen equipment).      Gastroscopy:  - You may have a sore throat for 2-3 days following the exam. This is normal. Gargling with warm salt water (1/2 tsp salt to 1 glass warm water) or using throat lozenges will help.  - If you experience any sharp pain in your neck, abdomen or chest, vomiting of blood, oral temperature over 100 degrees Fahrenheit, light-headedness or dizziness, or any other problems, contact your doctor.    **If unable to reach your doctor, please go to the [facility] Emergency Room**    - Your referring physician will receive a full report of your examination.  - If you do not hear from your doctor's office within two weeks of your biopsy, please call them for your results.    You may be able to see your laboratory results in [facility] between 4 and 7 business days.  In some cases, your physician may not have viewed the results before they are released to [facility].  If you have questions regarding your results contact the physician who ordered the test/exam by phone or via [facility] by choosing \"Ask a Medical Question.\"

## 2024-11-12 NOTE — OPERATIVE REPORT
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  No pancreas divisum, chronic pancreatitis   2.  1.26cm cystic lesion communicating with main PD with side branch without worrisome features   3.  Normal CBD  PROCEDURE PERFORMED: upper endoscopy/ ENDOSCOPIC ultrasound  SEDATION MEDICATIONS: MAC  PREOPERATIVE MEDICATIONS:   PREPROCEDURE ASSESSMENT: The indication for this procedure is to assess for cysts. The patient was identified by myself and nursing staff in the exam room. Informed consent was obtained. The patient was seen in clinic and a full H&P was obtained. On brief physical examination, airway is patent. Chest is clear. Heart has regular rate and rhythm. Abdomen is soft, nontender with good bowel sounds. A medication list was taken by nursing today and reviewed by myself. The patient is an ASA grade 2.   PROCEDURE NOTE: The procedure was completed without difficulty. The patient tolerated the procedure well.   Upper endoscopy (EGD):  The endoscope was inserted through the mouth and advanced to the level of the duodenum, 3rd portion.  Visualized portion of the esophagus, stomach including antrum, body, fundus and cardiac, and duodenum were normal.  Endoscopic ultrasound (EUS):  Endoscopic ultrasound was performed using the linear echoendoscope.  Images were obtained.    LIVER: Left lobe of the liver was visualized and no mass or lesions seen.  No intrahepatic duct dilation was noted.  Portal vein was noted to come out of the liver and the portal confluence was seen and pancreas was noted.   PANCREAS:  Pancreatic neck, body, and tail were interrogated from the gastric body while the neck, head and uncinate were examined from the 1st and 2nd duodenum.  PD - normal  throughout  Pancreas divisum: No - mercedes sign positive  Chronic pancreatitis changes: no  Neoplasm: no   Cysts:  yes   - uncinate: 1.26 cm - No worrisome feature such as mural nodules, wall thickness, associated mass, or upstream pancreatic duct dilation were noted.   Biliary Tree: normal   - stones:  no  GALLBLADDER: not visualized   CELIAC AXIS:  visualized without lymphadenopathy  L ADRENAL GLAND:  visualized   L KIDNEY:  visualized   MEDIASTINUM:  visualized without lymphadenopathy  Scope was withdrawn from the patient and patient tolerated the procedure well.  FINDINGS   Cyst- given communicating with main PD and improving size, this is most likely a pseudocyst- cyst was not easy to puncture without risking pancreatitis  RECOMMENDATIONS: repeat MRI in 6 months to check size of cyst  DISCHARGE:  On discharge, the patient was given an after-visit summary detailing the procedure, findings, followup plans, and an updated medication list.     Thank you very much for the consultation.  I really appreciate it.    Morris Chua MD

## 2024-11-12 NOTE — ANESTHESIA PREPROCEDURE EVALUATION
PRE-OP EVALUATION    Patient Name: Michele Oconnor    Admit Diagnosis: PANCREAS CYST, ACUTE RECURRENT PANCREATITIS    Pre-op Diagnosis: PANCREAS CYST, ACUTE RECURRENT PANCREATITIS    UPER ENDOSCOPIC ULTRASOUND (EUS)WITH FINE NEEDLE ASPIRATION    Anesthesia Procedure: UPER ENDOSCOPIC ULTRASOUND (EUS)WITH FINE NEEDLE ASPIRATION    Surgeons and Role:     * Morris Chua MD - Primary    Pre-op vitals reviewed.        Body mass index is 25.84 kg/m².    Current medications reviewed.  Hospital Medications:  No current facility-administered medications on file as of 11/12/2024.       Outpatient Medications:   Prescriptions Prior to Admission[1]    Allergies: Radiology contrast iodinated dyes      Anesthesia Evaluation    Patient summary reviewed.    Anesthetic Complications  (-) history of anesthetic complications         GI/Hepatic/Renal      (+) GERD                           Cardiovascular                                                       Endo/Other                                  Pulmonary      (+) asthma                     Neuro/Psych                                      Past Surgical History:   Procedure Laterality Date    Egd  06/20/2012    [facility] , Endoscopic US- neg    Ercp,diagnostic  08/03/2012    [place] [facility] [place]    Excision of lesion, eyelid - os - left eye Left 07/29/2021    Dr. Correa    Joint aspiration/injection Left 07/2021    Dr. Mcguire    Other surgical history  2012    sphincterectomy for pancreatitis    [place] teeth removed  2000 4     Social History     Socioeconomic History    Marital status:    Occupational History    Occupation:      Employer: [employer]     Comment: office 4 d/wk   Tobacco Use    Smoking status: Never    Smokeless tobacco: Never   Vaping Use    Vaping status: Never Used   Substance and Sexual Activity    Alcohol use: Yes     Comment: couple times a yr - 2 beers    Drug use: Never    Sexual activity: Yes     Partners: Female     Birth  control/protection: OCP     Comment: 1/yr, no sti   Other Topics Concern     Service No    Blood Transfusions No    Caffeine Concern No     Comment: 0-1    Occupational Exposure No    Hobby Hazards No    Sleep Concern No    Stress Concern No    Weight Concern No    Special Diet Yes     Comment: low fat, avoid Etoh    Back Care Yes    Exercise Yes     Comment: weights and cardio 6d/wk    Bike Helmet Yes    Seat Belt Yes    Self-Exams Yes     History   Drug Use Unknown     Available pre-op labs reviewed.               Airway      Mallampati: II  Mouth opening: 3 FB  TM distance: 4 - 6 cm  Neck ROM: full Cardiovascular      Rhythm: regular  Rate: normal     Dental  Comment: No loose teeth reported by patient           Pulmonary      Breath sounds clear to auscultation bilaterally.               Other findings              ASA: 2   Plan: MAC  NPO status verified and patient meets guidelines.          Plan/risks discussed with: patient                Present on Admission:  **None**             [1]   Medications Prior to Admission   Medication Sig Dispense Refill Last Dose/Taking    ALBUTEROL 108 (90 Base) MCG/ACT Inhalation Aero Soln TAKE 2 PUFFS BY MOUTH EVERY 6 HOURS AS NEEDED FOR WHEEZE OR SHORTNESS OF BREATH 13.4 each 0 Taking    Mometasone Furoate 0.1 % External Cream Apply to affected areas twice daily as needed 60 g 3 Taking    hydrocortisone 2.5 % External Cream Apply to affected areas as needed  - consider 7 days at a time 60 g 1 Taking    Tadalafil 20 MG Oral Tab Take 1 tablet (20 mg total) by mouth daily as needed.   Taking As Needed

## 2024-11-12 NOTE — H&P
History & Physical Examination    Patient Name: Michele Oconnor  MRN: [id number]  CSN: [id number]  YOB: 1980    Diagnosis: PANCREAS CYST, ACUTE RECURRENT PANCREATITIS       Present Illness:  Michele Oconnor is a 44 year old male is here PANCREAS CYST, ACUTE RECURRENT PANCREATITIS.    Body mass index is 25.84 kg/m².    Past Medical History:    Asthma due to environmental allergies (HCC)    dust and cats    Chronic recurrent pancreatitis (HCC)    last episode 2017. idiopathic, CFTR/PRSS1/SPINK1 - neg genetics 12/9/13. Rx Zenpep, lipase, PPI and Carafate in past. Neg IgG4 and anti tTGAb, MR  neg    Erectile dysfunction    difficulty maintaining erection - 10 mg Cialis prn. Dr. Cason    GERD (gastroesophageal reflux disease)    Mild intermittent asthma (HCC)    albuterol MDI prn - years since used       Procedure: EGD EUS    Physician Pre-Sedation Assessment    Pre-Sedation Assessment:    Sedation History: Airway Assessed    ASA Classification: 2. Patient with mild systemic disease    Cardiac:    Respiratory:    Abdomen:      Plan: MAC        Current Facility-Administered Medications   Medication Dose Route Frequency    lactated ringers infusion   Intravenous Continuous    levoFLOXacin in dextrose 5% (Levaquin) 500 mg/100mL IVPB premix 500 mg  500 mg Intravenous Once    levoFLOXacin in dextrose 5% (Levaquin) 500 mg/100mL IVPB premix         Facility-Administered Medications Ordered in Other Encounters   Medication Dose Route Frequency    lidocaine PF (Xylocaine-MPF) 1% injection   Intravenous PRN    propofol (Diprivan) 10 MG/ML injection   Intravenous PRN    propofol (Diprivan) 10 mg/mL infusion premix   Intravenous Continuous PRN       Allergies: Allergies[1]    Past Surgical History:   Procedure Laterality Date    Egd  06/20/2012    [facility] , Endoscopic US- neg    Ercp,diagnostic  08/03/2012    [place] [facility] [place]    Excision of lesion, eyelid - os - left eye Left 07/29/2021    Dr. Correa    Joint  aspiration/injection Left 07/2021    Dr. Mcguire    Other surgical history  2012    sphincterectomy for pancreatitis    [place] teeth removed  2000 4     Family History   Problem Relation Age of Onset    Hypertension Father     Thyroid Disorder Mother     No Known Problems Daughter     No Known Problems Son     Dementia Maternal Grandmother     Melanoma Maternal Grandmother     Gastro-Intestinal Disorder Maternal Grandfather         celiac disease    Prostate Cancer Maternal Grandfather     No Known Problems Paternal Grandmother     Heart Disorder Paternal Grandfather     Colon Cancer Paternal Grandfather 50    No Known Problems Brother      Social History     Tobacco Use    Smoking status: Never    Smokeless tobacco: Never   Substance Use Topics    Alcohol use: Yes     Comment: couple times a yr - 2 [employer]       SYSTEM Check if Review is Normal Check if Physical Exam is Normal If not normal, please explain:   HEENT [x ] [x ]    NECK & BACK [x ] [x ]    HEART [x ] [x ]    LUNGS [x ] [x ]    ABDOMEN [x ] [x ]    UROGENITAL [ ] [ ]    EXTREMITIES [ ] [ ]    OTHER        [ x ] I have discussed the risks and benefits and alternatives with the patient/family.  They understand and agree to proceed with plan of care.  [ x ] I have reviewed the History and Physical done within the last 30 days.  Any changes noted above.    Morris Chua MD  11/12/2024  4:15 PM             [1]   Allergies  Allergen Reactions    Radiology Contrast Iodinated Dyes RASH     Itching, sneezing

## (undated) DEVICE — 10FT COMBINED O2 DELIVERY/CO2 MONITORING. FILTER WITH MICROSTREAM TYPE LUER: Brand: DUAL ADULT NASAL CANNULA

## (undated) DEVICE — KIT CUSTOM ENDOPROCEDURE STERIS

## (undated) DEVICE — V2 SPECIMEN COLLECTION MANIFOLD KIT: Brand: NEPTUNE

## (undated) DEVICE — GLOVE,SURG,SENSICARE,ALOE,LF,PF,7: Brand: MEDLINE

## (undated) DEVICE — BALLOON HEMOSTATIC EUS LINEAR

## (undated) DEVICE — 1200CC GUARDIAN II: Brand: GUARDIAN

## (undated) DEVICE — 3M™ RED DOT™ MONITORING ELECTRODE WITH FOAM TAPE AND STICKY GEL, 50/BAG, 20/CASE, 72/PLT 2570: Brand: RED DOT™